# Patient Record
Sex: MALE | Race: BLACK OR AFRICAN AMERICAN | NOT HISPANIC OR LATINO | Employment: OTHER | URBAN - METROPOLITAN AREA
[De-identification: names, ages, dates, MRNs, and addresses within clinical notes are randomized per-mention and may not be internally consistent; named-entity substitution may affect disease eponyms.]

---

## 2022-06-06 ENCOUNTER — TELEPHONE (OUTPATIENT)
Dept: OPHTHALMOLOGY | Facility: CLINIC | Age: 62
End: 2022-06-06
Payer: OTHER GOVERNMENT

## 2022-06-06 ENCOUNTER — PROCEDURE VISIT (OUTPATIENT)
Dept: OPHTHALMOLOGY | Facility: CLINIC | Age: 62
End: 2022-06-06
Payer: OTHER GOVERNMENT

## 2022-06-06 VITALS — SYSTOLIC BLOOD PRESSURE: 152 MMHG | DIASTOLIC BLOOD PRESSURE: 95 MMHG

## 2022-06-06 DIAGNOSIS — S01.112A: Primary | ICD-10-CM

## 2022-06-06 PROCEDURE — 68815 PROBE NASOLACRIMAL DUCT: CPT | Mod: PBBFAC | Performed by: OPHTHALMOLOGY

## 2022-06-06 PROCEDURE — 67935 PR SUTURE EYELID WOUND,FULL THICK: ICD-10-PCS | Mod: S$PBB,LT,, | Performed by: OPHTHALMOLOGY

## 2022-06-06 PROCEDURE — 68815 PR PROBE NASOLAC DUCT,INSERT TUBE/STENT: ICD-10-PCS | Mod: 51,S$PBB,LT, | Performed by: OPHTHALMOLOGY

## 2022-06-06 PROCEDURE — 99499 UNLISTED E&M SERVICE: CPT | Mod: S$PBB,,, | Performed by: OPHTHALMOLOGY

## 2022-06-06 PROCEDURE — 67935 REPAIR EYELID WOUND: CPT | Mod: PBBFAC | Performed by: OPHTHALMOLOGY

## 2022-06-06 PROCEDURE — 67935 REPAIR EYELID WOUND: CPT | Mod: S$PBB,LT,, | Performed by: OPHTHALMOLOGY

## 2022-06-06 PROCEDURE — 15004 PR WND PREP PED, FACE/NCK/HND/FT/GEN 1ST 100 CM: ICD-10-PCS | Mod: 51,S$PBB,, | Performed by: OPHTHALMOLOGY

## 2022-06-06 PROCEDURE — 15004 WOUND PREP F/N/HF/G: CPT | Mod: PBBFAC | Performed by: OPHTHALMOLOGY

## 2022-06-06 PROCEDURE — 15004 WOUND PREP F/N/HF/G: CPT | Mod: 51,S$PBB,, | Performed by: OPHTHALMOLOGY

## 2022-06-06 PROCEDURE — 68815 PROBE NASOLACRIMAL DUCT: CPT | Mod: 51,S$PBB,LT, | Performed by: OPHTHALMOLOGY

## 2022-06-06 PROCEDURE — 99499 NO LOS: ICD-10-PCS | Mod: S$PBB,,, | Performed by: OPHTHALMOLOGY

## 2022-06-06 RX ORDER — HYDROCODONE BITARTRATE AND ACETAMINOPHEN 5; 325 MG/1; MG/1
1 TABLET ORAL EVERY 6 HOURS PRN
Qty: 16 TABLET | Refills: 0 | Status: SHIPPED | OUTPATIENT
Start: 2022-06-06 | End: 2024-03-27

## 2022-06-06 NOTE — PROGRESS NOTES
HPI     Pt states that on Saturday he tried to run off a small dog who was trying   to assault him and as he was running he fell into a ditch head first.   Doing this, he suffered a lid laceration of the LLL near the medial   canthus.   He is having a lot of pain on his back and neck that is distracting him   from any pain near the eye.  He states there seems to be a lot of dryness OS since the incident.   He has taken naproxen.     Last edited by Shruthi Sahu on 6/6/2022 11:20 AM. (History)            Assessment /Plan     For exam results, see Encounter Report.    Canalicular eyelid laceration, left, initial encounter    Other orders  -     HYDROcodone-acetaminophen (NORCO) 5-325 mg per tablet; Take 1 tablet by mouth every 6 (six) hours as needed for Pain.  Dispense: 16 tablet; Refill: 0        Procedure: Canalicular Eyelid Laceration Repair, Left Lower Eyelid  Date of Procedure:  06/06/2022  Staff Physician: Adore Pereira MD  Resident Physician:  Edmar Bolaños MD  Pre-Operative Diagnosis:   Full-thickness Canalicular-involving left lower eyelid laceration  Post-Operative Diagnosis: Same as pre-operative diagnosis  Treatments/Procedures Performed:   1. Left lower eyelid laceration repair, canalicular-involving   2. Removal of granulation tissue   3. Probing, nasolacrimal duct, with tube insertion, left  Intraoperative Findings: Left lower eyelid laceration, canalicular involving (5 mm long)  Anesthesia: Topical 0.5% proparacaine eye drops, Local (mixture of 2% lidocaine with 1:200,000 epinephrine, 0.5% bupivacaine, Vitrase)   Complications: None  Estimated Blood Loss:  < 5 cc  Implant: Mini Monoka stent  Specimens: None   Procedure in detail: 62 year old male presenting with left eyelid laceration secondary to trauma presenting for repair.  Risks, benefits, alternatives, and complications were discussed thoroughly with the patient and consent was obtained, the same discussion was repeated in the pre-operative  area with the patient.     The patient was brought to the operating room and placed in supine position.  A time out was performed including patient's name, date of birth, allergies, surgical site, and anticipated procedure.  After adequate anesthesia was achieved, the patient was prepped and draped in sterile fashion using topical Betadine. A BSS-soaked gauze was placed over the closed right eye for the duration of the case.      Attention was drawn to the left eye. The left lower eyelid laceration appreciated at the medial canthus was inspected thoroughly and measured 5 mm in length. A corneal shield was placed in the left eye. A 15-blade was used to debride the granulation tissue along the borders of the eyelid defect. A punctal dilator was introduced into the left lower canaliculus and dilated then the dilator was removed. A #1 Marc probe was introduced into the left lower canaliculus and found to exit through the laceration, the Marc probe was then removed. A mini Monoka stent was placed through the left lower punctum and out through the wound. Upon inspection, the distal end of the canaliculus was identified. The mini Monoka stent was trimmed to appropriate length and used to intubate the distal end of the lower canaliculus. Two Vicryl 7-0 horizontal mattress sutures were then used to reapproximate the lacerated ends of the orbicularis muscle overlying the mini Monoka stent allowing for complete coverage of the stent, using 1 suture anterior to the stent and 1 posterior. The placement of these sutures reapproximated the posterior and anterior aspects of the lacerated eyelid margins in the medial canthal region. A 6-0 Nurolon suture was placed in vertical mattress fashion at the lash line to approximate and isaac the wound edges at the eyelid margin; the tails were left long and tied down with a superficial 6-0 Nurolon suture on the skin of the inferior aspect of the left lower eyelid. The skin was then  closed with interrupted 6-0 Nurolon. Good approximation was attained. Tobradex ointment was placed over the closed laceration wounds     The patient tolerated the procedure without complication. The patient was educated on post-procedure care and general return precautions. All questions were answered. The patient will follow-up in clinic as directed.

## 2022-06-14 ENCOUNTER — OFFICE VISIT (OUTPATIENT)
Dept: OPHTHALMOLOGY | Facility: CLINIC | Age: 62
End: 2022-06-14
Payer: OTHER GOVERNMENT

## 2022-06-14 DIAGNOSIS — Z98.890 POST-OPERATIVE STATE: Primary | ICD-10-CM

## 2022-06-14 DIAGNOSIS — S01.112D CANALICULAR LACERATION, LEFT, SUBSEQUENT ENCOUNTER: ICD-10-CM

## 2022-06-14 PROCEDURE — 92285 EXTERNAL OCULAR PHOTOGRAPHY: CPT | Mod: PBBFAC | Performed by: OPHTHALMOLOGY

## 2022-06-14 PROCEDURE — 99212 OFFICE O/P EST SF 10 MIN: CPT | Mod: PBBFAC | Performed by: OPHTHALMOLOGY

## 2022-06-14 PROCEDURE — 99999 PR PBB SHADOW E&M-EST. PATIENT-LVL II: ICD-10-PCS | Mod: PBBFAC,,, | Performed by: OPHTHALMOLOGY

## 2022-06-14 PROCEDURE — 99999 PR PBB SHADOW E&M-EST. PATIENT-LVL II: CPT | Mod: PBBFAC,,, | Performed by: OPHTHALMOLOGY

## 2022-06-14 PROCEDURE — 99024 POSTOP FOLLOW-UP VISIT: CPT | Mod: ,,, | Performed by: OPHTHALMOLOGY

## 2022-06-14 PROCEDURE — 99024 PR POST-OP FOLLOW-UP VISIT: ICD-10-PCS | Mod: ,,, | Performed by: OPHTHALMOLOGY

## 2022-06-14 NOTE — PROGRESS NOTES
Assessment /Plan     For exam results, see Encounter Report.    Post-operative state  -     External Photography - OU - Both Eyes  -     SUTURE REMOVAL    Canalicular laceration, left, subsequent encounter      Patient doing well! Post-operative instructions reviewed. Sutures removed. All questions answered.  Return in 5 weeks prn sooner any worsening of vision/symptoms or any concerns.

## 2022-09-27 ENCOUNTER — OFFICE VISIT (OUTPATIENT)
Dept: OPHTHALMOLOGY | Facility: CLINIC | Age: 62
End: 2022-09-27
Payer: OTHER GOVERNMENT

## 2022-09-27 DIAGNOSIS — S01.119D: Primary | ICD-10-CM

## 2022-09-27 PROCEDURE — 99214 PR OFFICE/OUTPT VISIT, EST, LEVL IV, 30-39 MIN: ICD-10-PCS | Mod: 25,S$PBB,, | Performed by: OPHTHALMOLOGY

## 2022-09-27 PROCEDURE — 99211 OFF/OP EST MAY X REQ PHY/QHP: CPT | Mod: PBBFAC,25 | Performed by: OPHTHALMOLOGY

## 2022-09-27 PROCEDURE — 68840 EXPLORE/IRRIGATE TEAR DUCTS: CPT | Mod: S$PBB,LT,, | Performed by: OPHTHALMOLOGY

## 2022-09-27 PROCEDURE — 68840 EXPLORE/IRRIGATE TEAR DUCTS: CPT | Mod: PBBFAC | Performed by: OPHTHALMOLOGY

## 2022-09-27 PROCEDURE — 99214 OFFICE O/P EST MOD 30 MIN: CPT | Mod: 25,S$PBB,, | Performed by: OPHTHALMOLOGY

## 2022-09-27 PROCEDURE — 99999 PR PBB SHADOW E&M-EST. PATIENT-LVL I: CPT | Mod: PBBFAC,,, | Performed by: OPHTHALMOLOGY

## 2022-09-27 PROCEDURE — 68840 PR EXPLORE LACRIMAL CANALICULI: ICD-10-PCS | Mod: S$PBB,LT,, | Performed by: OPHTHALMOLOGY

## 2022-09-27 PROCEDURE — 99999 PR PBB SHADOW E&M-EST. PATIENT-LVL I: ICD-10-PCS | Mod: PBBFAC,,, | Performed by: OPHTHALMOLOGY

## 2022-09-27 RX ORDER — BRIMONIDINE TARTRATE 0.025 %
1 DROPS OPHTHALMIC (EYE) EVERY 8 HOURS PRN
Qty: 2.5 ML | Refills: 3 | Status: SHIPPED | OUTPATIENT
Start: 2022-09-27

## 2022-09-27 NOTE — PROGRESS NOTES
HPI    Edmar Benitez is a/an 62 y.o. male here for left stent removal  Date of procedure: 6/6/22  Procedure: Lid lac repair and stent placement LLL  Oral antibiotics: none   Eye meds: none    Pt doing well following sx   Slight itching near where stent was placed    Last edited by Shruthi Sahu on 9/27/2022  3:25 PM.            Assessment /Plan     For exam results, see Encounter Report.    Canalicular laceration, subsequent encounter    Other orders  -     brimonidine (LUMIFY) 0.025 % Drop; Apply 1 drop to eye every 8 (eight) hours as needed.  Dispense: 2.5 mL; Refill: 3             Patient doing well! Mini Monoka removed from LL puncta without complications.    Procedure note:  Tear duct probe RBA discussed and consent signed. System found to be patent.     Eye redness. Start Lumify TID PRN

## 2024-03-22 DIAGNOSIS — M25.562 LEFT KNEE PAIN, UNSPECIFIED CHRONICITY: Primary | ICD-10-CM

## 2024-03-26 ENCOUNTER — TELEPHONE (OUTPATIENT)
Dept: ORTHOPEDICS | Facility: CLINIC | Age: 64
End: 2024-03-26
Payer: OTHER GOVERNMENT

## 2024-03-26 NOTE — TELEPHONE ENCOUNTER
Spoke with patient.  Appointment tomorrow changed to 1 p.m. due to  surgery. Verbalized understanding.

## 2024-03-27 ENCOUNTER — OFFICE VISIT (OUTPATIENT)
Dept: ORTHOPEDICS | Facility: CLINIC | Age: 64
End: 2024-03-27
Payer: OTHER GOVERNMENT

## 2024-03-27 ENCOUNTER — HOSPITAL ENCOUNTER (OUTPATIENT)
Dept: RADIOLOGY | Facility: HOSPITAL | Age: 64
Discharge: HOME | End: 2024-03-27
Attending: ORTHOPAEDIC SURGERY
Payer: OTHER GOVERNMENT

## 2024-03-27 VITALS — HEIGHT: 76 IN | BODY MASS INDEX: 28.49 KG/M2 | WEIGHT: 234 LBS

## 2024-03-27 DIAGNOSIS — M17.12 PRIMARY OSTEOARTHRITIS OF LEFT KNEE: Primary | ICD-10-CM

## 2024-03-27 DIAGNOSIS — M25.562 LEFT KNEE PAIN, UNSPECIFIED CHRONICITY: ICD-10-CM

## 2024-03-27 PROCEDURE — 99213 OFFICE O/P EST LOW 20 MIN: CPT | Mod: PBBFAC,25,PN | Performed by: ORTHOPAEDIC SURGERY

## 2024-03-27 PROCEDURE — 99999 PR PBB SHADOW E&M-EST. PATIENT-LVL III: CPT | Mod: PBBFAC,,, | Performed by: ORTHOPAEDIC SURGERY

## 2024-03-27 PROCEDURE — 99203 OFFICE O/P NEW LOW 30 MIN: CPT | Mod: S$PBB,,, | Performed by: ORTHOPAEDIC SURGERY

## 2024-03-27 PROCEDURE — 73564 X-RAY EXAM KNEE 4 OR MORE: CPT | Mod: TC,PN,LT

## 2024-03-27 PROCEDURE — 73564 X-RAY EXAM KNEE 4 OR MORE: CPT | Mod: 26,LT,, | Performed by: RADIOLOGY

## 2024-03-27 NOTE — PROGRESS NOTES
[facility], Orthopedics and Sports Medicine  [facility]    New Patient Office Visit  03/27/2024     Subjective:      Edmar Benitez is a 63 y.o. male referred by Roseannrefstefanyal Bola for evaluation and treatment of left knee pain. This is evaluated as a personal injury.  The patient has the following symptoms: pain located medial left knee pain .  The symptoms began around 10 years ago and are worsening.  [facility].  He endorses receiving steroid injections in the past and 1 gel injection. Received around 4 steroid injections and one gel injection over the past few years. His last injection was the gel injection which was around 10 months ago. He does say after the steroid injections he would receive about 3 months of pain relief following. He did receive 1 gel injection about 10 months ago which provided no pain relief. Current daily medications include meloxicam as needed. Patient endorses there was discussion for knee replacement but never did follow-up.     Patient is currently on disability. Previously worked as an . He would like to return to work.    Outside reports reviewed: historical medical records, office notes, radiology reports, and x-ray reports.    History reviewed. No pertinent past medical history.    There is no problem list on file for this patient.      History reviewed. No pertinent surgical history.     Current Outpatient Medications   Medication Instructions    brimonidine (LUMIFY) 0.025 % Drop 1 drop, Ophthalmic, Every 8 hours PRN        Review of patient's allergies indicates:  No Known Allergies    Social History     Socioeconomic History    Marital status:    Tobacco Use    Smoking status: Unknown       History reviewed. No pertinent family history.      Review of Systems   Constitutional: Negative for chills and fever.   HENT:  Negative for congestion.    Eyes:  Negative for  blurred vision.   Cardiovascular:  Negative for chest pain, dyspnea on exertion and palpitations.   Respiratory:  Negative for cough and shortness of breath.    Hematologic/Lymphatic: Negative for bleeding problem. Does not bruise/bleed easily.   Skin:  Negative for color change and rash.   Musculoskeletal:  Positive for joint pain (L knee). Negative for joint swelling and muscle weakness.   Gastrointestinal:  Negative for abdominal pain.   Genitourinary:  Negative for bladder incontinence.   Neurological:  Negative for focal weakness, numbness and paresthesias.   Psychiatric/Behavioral:  Negative for depression. The patient is not nervous/anxious.           Objective:      General    Constitutional: He is oriented to person, place, and time. He appears well-developed and well-nourished.   HENT:   Head: Normocephalic and atraumatic.   Eyes: Pupils are equal, round, and reactive to light.   Cardiovascular:  Normal rate and regular rhythm.            Pulmonary/Chest: Effort normal and breath sounds normal. No respiratory distress.   Abdominal: Soft.   Neurological: He is alert and oriented to person, place, and time.   Psychiatric: He has a normal mood and affect. His behavior is normal.           Right Knee Exam     Inspection   Swelling: absent  Effusion: absent  Deformity: absent    Tenderness   The patient is experiencing no tenderness.     Range of Motion   Extension:  0   Flexion:  120     Tests   Ligament Examination   Lachman: normal (-1 to 2mm)   PCL-Posterior Drawer: normal (0 to 2mm)     MCL - Valgus: normal (0 to 2mm)  LCL - Varus: normal    Other   Sensation: normal    Left Knee Exam     Inspection   Swelling: absent  Effusion: absent  Deformity: absent    Tenderness   The patient tender to palpation of the medial joint line and lateral joint line.    Crepitus   The patient has crepitus of the patella.    Range of Motion   Extension:  5   Flexion:  110     Tests   Stability   Lachman: normal (-1 to 2mm)    PCL-Posterior Drawer: normal (0 to 2mm)  MCL - Valgus: normal (0 to 2mm)  LCL - Varus: normal (0 to 2mm)    Other   Sensation: normal    Muscle Strength   Right Lower Extremity   Quadriceps:  5/5   Hamstrin/5   Left Lower Extremity   Quadriceps:  5/5   Hamstrin/5       Imaging:  Radiographs of the left knee taken taken 2024 were personally reviewed from the [facility] EMR.  Multiple views of the knee are available today for review, including a standing AP, a standing notch view, lateral view, and a merchant view.  The tibiofemoral compartment demonstrates severe degenerative changes.  The patellofemoral compartment demonstrates severe degenerative changes.  No acute fractures or dislocations are noted in these images.      Procedures        Assessment:       Edmar Benitez is a 63 y.o. male seen in the office today. The encounter diagnosis was Primary osteoarthritis of left knee.  Referal to another provider (arthroplasty ) is recommended at this time. Can use Tylenol and sparing use of NSAIDs. The natural history and expected course discussed with patient. Various treatment options were discussed, including their risks and benefits. All of the patient's questions were answered. Frustrated with conservative treatment failure, wants to discuss more permanent solution.      Plan:      Tylenol 650mg TID, PRN pain.  Follow up as needed if symptoms worsen.  Referral to arthroplasty .        Marko Yatse IV, MD   of Clinical Orthopedics  Department of Orthopedic Surgery  [facility]  Office: [phone]  Website: [email or web address]      Orders Placed This Encounter    Ambulatory referral/consult to Orthopedics

## 2024-04-02 ENCOUNTER — RESEARCH ENCOUNTER (OUTPATIENT)
Dept: RESEARCH | Facility: HOSPITAL | Age: 64
End: 2024-04-02
Payer: OTHER GOVERNMENT

## 2024-04-02 ENCOUNTER — OFFICE VISIT (OUTPATIENT)
Dept: ORTHOPEDICS | Facility: CLINIC | Age: 64
End: 2024-04-02
Payer: OTHER GOVERNMENT

## 2024-04-02 VITALS — WEIGHT: 233.94 LBS | BODY MASS INDEX: 28.49 KG/M2 | HEIGHT: 76 IN

## 2024-04-02 DIAGNOSIS — M17.12 PRIMARY OSTEOARTHRITIS OF LEFT KNEE: Primary | ICD-10-CM

## 2024-04-02 DIAGNOSIS — G89.29 CHRONIC PAIN OF LEFT KNEE: ICD-10-CM

## 2024-04-02 DIAGNOSIS — M25.562 CHRONIC PAIN OF LEFT KNEE: ICD-10-CM

## 2024-04-02 PROCEDURE — 99213 OFFICE O/P EST LOW 20 MIN: CPT | Mod: 25,S$PBB,, | Performed by: ORTHOPAEDIC SURGERY

## 2024-04-02 PROCEDURE — 99214 OFFICE O/P EST MOD 30 MIN: CPT | Mod: PBBFAC,PN,25 | Performed by: ORTHOPAEDIC SURGERY

## 2024-04-02 PROCEDURE — 99999 PR PBB SHADOW E&M-EST. PATIENT-LVL IV: CPT | Mod: PBBFAC,,, | Performed by: ORTHOPAEDIC SURGERY

## 2024-04-02 PROCEDURE — 20610 DRAIN/INJ JOINT/BURSA W/O US: CPT | Mod: PBBFAC,PN,LT | Performed by: ORTHOPAEDIC SURGERY

## 2024-04-02 PROCEDURE — 99999PBSHW PR PBB SHADOW TECHNICAL ONLY FILED TO HB: Mod: PBBFAC,,,

## 2024-04-02 RX ORDER — BUPROPION HYDROCHLORIDE 150 MG/1
1 TABLET ORAL DAILY
COMMUNITY
Start: 2024-02-14

## 2024-04-02 RX ORDER — AMLODIPINE BESYLATE 10 MG/1
10 TABLET ORAL
COMMUNITY
Start: 2023-06-12

## 2024-04-02 RX ORDER — BUTALB/ACETAMINOPHEN/CAFFEINE 50-325-40
1 TABLET ORAL 2 TIMES DAILY
COMMUNITY
Start: 2023-06-12

## 2024-04-02 RX ORDER — TAMSULOSIN HYDROCHLORIDE 0.4 MG/1
0.4 CAPSULE ORAL
COMMUNITY
Start: 2024-01-14

## 2024-04-02 RX ORDER — EZETIMIBE 10 MG/1
10 TABLET ORAL
COMMUNITY
Start: 2023-06-12

## 2024-04-02 RX ORDER — LIDOCAINE HYDROCHLORIDE 10 MG/ML
4 INJECTION INFILTRATION; PERINEURAL
Status: DISCONTINUED | OUTPATIENT
Start: 2024-04-02 | End: 2024-04-02 | Stop reason: HOSPADM

## 2024-04-02 RX ORDER — BUPIVACAINE HYDROCHLORIDE 5 MG/ML
5 INJECTION, SOLUTION PERINEURAL
Status: DISCONTINUED | OUTPATIENT
Start: 2024-04-02 | End: 2024-04-02 | Stop reason: HOSPADM

## 2024-04-02 RX ORDER — FLUTICASONE PROPIONATE 50 MCG
SPRAY, SUSPENSION (ML) NASAL
COMMUNITY
Start: 2023-06-12

## 2024-04-02 RX ORDER — KETOROLAC TROMETHAMINE 30 MG/ML
30 INJECTION, SOLUTION INTRAMUSCULAR; INTRAVENOUS
Status: DISCONTINUED | OUTPATIENT
Start: 2024-04-02 | End: 2024-04-02 | Stop reason: HOSPADM

## 2024-04-02 RX ORDER — MELOXICAM 7.5 MG/1
7.5 TABLET ORAL
COMMUNITY
Start: 2023-08-04

## 2024-04-02 RX ORDER — LORATADINE 10 MG/1
10 TABLET ORAL
COMMUNITY
Start: 2023-06-12

## 2024-04-02 RX ORDER — ERGOCALCIFEROL 1.25 MG/1
CAPSULE ORAL
COMMUNITY
Start: 2023-06-12

## 2024-04-02 RX ADMIN — KETOROLAC TROMETHAMINE 30 MG: 30 INJECTION, SOLUTION INTRAMUSCULAR; INTRAVENOUS at 09:04

## 2024-04-02 RX ADMIN — LIDOCAINE HYDROCHLORIDE 4 ML: 10 INJECTION, SOLUTION INFILTRATION; PERINEURAL at 09:04

## 2024-04-02 RX ADMIN — BUPIVACAINE HYDROCHLORIDE 5 ML: 5 INJECTION, SOLUTION PERINEURAL at 09:04

## 2024-04-02 NOTE — PROGRESS NOTES
[facility] [address]          Subjective:     Patient ID: Edmar Benitez is a 63 y.o. male with chief complaint of left knee pain.  He was tried numerous rounds of corticosteroid and gel injections.  He got minimal relief from these.  He was tried anti-inflammatories including meloxicam which he was on currently.  He has had a few sessions with physical therapy.      KNEE PAIN: Complains of pain to the leftknee.   PAIN LOCATED: anterior, lateral, and medial  ONSET: 20 years ago.  QUALITY:  Patient states the pain is worsening  HISTORY: Previous knee injury/surgery: No   ASSOCIATED SYMPTOM AND TRIGGERS:Standing/Weightbearing, walking, trouble w stairs, stiffness, swelling, limping, stiffness w sitting   Has tried and failed cardiovascular activities such as walking, biking and resistance exercises  USES ASSISTIVE DEVICE: none  RELIEVED BY:rest  PATIENT DENIES: bruising, redness, deformity      Chief Complaint: Pain of the Left Knee        History reviewed. No pertinent past medical history.     History reviewed. No pertinent surgical history.     Current Outpatient Medications   Medication Instructions    amLODIPine (NORVASC) 10 mg    brimonidine (LUMIFY) 0.025 % Drop 1 drop, Ophthalmic, Every 8 hours PRN    buPROPion (WELLBUTRIN XL) 150 MG TB24 tablet 1 tablet, Oral, Daily    calcium citrate-vitamin D3 315-200 mg (CITRACAL+D) 315 mg-5 mcg (200 unit) per tablet 1 tablet, Oral, 2 times daily    empagliflozin (JARDIANCE) 25 mg tablet 1 tablet, Oral, Every morning    ergocalciferol (ERGOCALCIFEROL) 50,000 unit Cap TAKE 50,000 UNITS (1,250MCG) BY MOUTH EVERY WEEK AS A VITAMIN SUPPLEMENT    ezetimibe (ZETIA) 10 mg    fluticasone propionate (FLONASE) 50 mcg/actuation nasal spray INSTILL 1 SPRAY IN EACH NOSTRIL EVERY DAY AS NEEDED FOR ALLERGIES    loratadine (CLARITIN) 10 mg    meloxicam (MOBIC) 7.5 mg    tamsulosin (FLOMAX) 0.4 mg        Review of patient's allergies indicates:  No Known Allergies    Social  History     Socioeconomic History    Marital status:    Tobacco Use    Smoking status: Unknown   Substance and Sexual Activity    Alcohol use: Never    Drug use: Never       History reviewed. No pertinent family history.      Review of systems positive for left knee pain.     Objective:   Physical Exam:     Skin atraumatic   Tender to palpation worse medially than laterally  ROM   Stable anterior/posterior   Stable varus/valgus  No groin pain with rotation of hip  Motor intact TA/GS/EHL/FHL  SILT SP/DP/Sa/Starkey/T  Toes WWP      Imaging:   X-Ray left knee reviewed, KL 4 changes with joint space narrowing, sclerosis, and osteophytosis.       Assessment:        Edmar Benitez is a 63 y.o. male with severe left knee osteoarthritis    Encounter Diagnoses   Name Primary?    Primary osteoarthritis of left knee Yes    Chronic pain of left knee        Plan :  we injected the left knee w 1cc of ketorolac, marcaine and lidocaine under sterile conditions with the patient's informed consent for severe bone on bone knee oa   Follow up for Iovera  Tentatively plan for left total knee arthroplasty 11/18/2024    I discussed a new treatment therapy called Iovera, which is cryotherapy, to provide symptomatic relief along the sensory distribution of the infrapatellar tendon branch of the saphenous nerve, AFCN, and LFCN.  The patient elected to move forward with this.  We did discuss the fact that this is a fairly novel procedure and the is very limited scientific data around this; however, it is FDA approved.  In a few patients there can be continued pain along the treated nerve but the exact risk has not been quantified but seems to be rare. The patient was given patient information and literature to review prior to the procedure as well. Based on this, the patient feels the benefits outweigh the risks.               Orders Placed This Encounter   Procedures    Large Joint Aspiration/Injection: L knee    Iovera         Toñito Escobar MD   04/02/2024

## 2024-04-02 NOTE — PROCEDURES
Large Joint Aspiration/Injection: L knee    Date/Time: 4/2/2024 9:45 AM    Performed by: Obed Wiley MD  Authorized by: Obed Wiley MD    Consent Done?:  Yes (Verbal)  Indications:  Arthritis  Site marked: the procedure site was marked    Timeout: prior to procedure the correct patient, procedure, and site was verified    Prep: patient was prepped and draped in usual sterile fashion      Local anesthesia used?: Yes    Local anesthetic:  Topical anesthetic    Details:  Needle Size:  22 G  Ultrasonic Guidance for needle placement?: No    Approach:  Anterolateral  Location:  Knee  Site:  L knee  Medications:  30 mg ketorolac 30 mg/mL (1 mL); 5 mL BUPivacaine 0.5 % (5 mg/mL); 4 mL LIDOcaine HCL 10 mg/ml (1%) 10 mg/mL (1 %)  Patient tolerance:  Patient tolerated the procedure well with no immediate complications

## 2024-04-02 NOTE — PROGRESS NOTES
Protocol: innovations in Genicular Outcomes Registry (Deidre)  Protocol#: Deidre  IRB#: 2021.156  Version Date: 10-Adiel-2023  PI: Obed Wiley MD  Patient Initials: HEVER     Study Recruitment Note:     Research coordinator met with patient, in private clinic room, to discuss above mentioned protocol. Patient is alert and oriented x 3. Mood and affect appropriate to the situation. Patient states that he is not participating in any other research studies. Patient states understanding about the diagnosis of osteoarthritis of the knee and of the procedure to being done on that knee.  Purpose of study reviewed with the patient.  Patient states understanding of this information.   Length of study and number of participants reviewed with patient; patient states understanding of the length of the study.   Study procedure discussed in detail with patient. Patient states understanding of this information.  Potential benefits of study along with costs and/or payment reimbursement per insurance discussed with patient; Patient states understanding that insurance will cover cost of all standard of care medications and procedures. Patient aware of $20 payment for qualifying visits with completed questionnaires.  Alternative treatment methods discussed with patient; Patient states understanding of this information.   Study related questions/compensation for injury, and whom to contact regarding rights as a research subject all reviewed with patient; Patient verbalizes understanding of this information.   Voluntary participation and withdrawal from research stressed to patient; patient states understanding that he may withdraw consent at any time without compromise to care.   Confidentiality and HIPAA discussed with patient. Patient verbalizes understanding of this information.          Patient states his interest in study and would like to think about participation. Study brochure and paper copy of consent form was given to patient for  review. He was instructed to call if he has any questions or concerns about the study. Patient verbally stated his understanding.

## 2024-04-24 ENCOUNTER — TELEPHONE (OUTPATIENT)
Dept: ORTHOPEDICS | Facility: CLINIC | Age: 64
End: 2024-04-24
Payer: OTHER GOVERNMENT

## 2024-04-24 NOTE — TELEPHONE ENCOUNTER
Spoke with patient.  Iovera appointment canceled tomorrow. Insurance has not approved. [facility]. Verbalized understanding and will call once done to reschedule.

## 2024-07-09 ENCOUNTER — CLINICAL SUPPORT (OUTPATIENT)
Dept: LAB | Facility: HOSPITAL | Age: 64
End: 2024-07-09
Attending: ORTHOPAEDIC SURGERY
Payer: OTHER GOVERNMENT

## 2024-07-09 ENCOUNTER — RESEARCH ENCOUNTER (OUTPATIENT)
Dept: RESEARCH | Facility: HOSPITAL | Age: 64
End: 2024-07-09
Payer: OTHER GOVERNMENT

## 2024-07-09 ENCOUNTER — HOSPITAL ENCOUNTER (OUTPATIENT)
Dept: RADIOLOGY | Facility: HOSPITAL | Age: 64
Discharge: HOME | End: 2024-07-09
Attending: ORTHOPAEDIC SURGERY
Payer: OTHER GOVERNMENT

## 2024-07-09 ENCOUNTER — OFFICE VISIT (OUTPATIENT)
Dept: ORTHOPEDICS | Facility: CLINIC | Age: 64
End: 2024-07-09
Payer: OTHER GOVERNMENT

## 2024-07-09 VITALS — WEIGHT: 233.94 LBS | HEIGHT: 76 IN | BODY MASS INDEX: 28.49 KG/M2

## 2024-07-09 DIAGNOSIS — M25.562 CHRONIC PAIN OF LEFT KNEE: ICD-10-CM

## 2024-07-09 DIAGNOSIS — F32.A DEPRESSION, UNSPECIFIED DEPRESSION TYPE: ICD-10-CM

## 2024-07-09 DIAGNOSIS — G89.29 CHRONIC PAIN OF LEFT KNEE: ICD-10-CM

## 2024-07-09 DIAGNOSIS — M17.12 PRIMARY OSTEOARTHRITIS OF LEFT KNEE: ICD-10-CM

## 2024-07-09 DIAGNOSIS — M62.81 QUADRICEPS WEAKNESS: ICD-10-CM

## 2024-07-09 DIAGNOSIS — M17.12 PRIMARY OSTEOARTHRITIS OF LEFT KNEE: Primary | ICD-10-CM

## 2024-07-09 PROCEDURE — 77073 BONE LENGTH STUDIES: CPT | Mod: TC,FY

## 2024-07-09 PROCEDURE — 99999 PR PBB SHADOW E&M-EST. PATIENT-LVL IV: CPT | Mod: PBBFAC,,, | Performed by: ORTHOPAEDIC SURGERY

## 2024-07-09 PROCEDURE — 77073 BONE LENGTH STUDIES: CPT | Mod: 26,,, | Performed by: RADIOLOGY

## 2024-07-09 PROCEDURE — G2211 COMPLEX E/M VISIT ADD ON: HCPCS | Mod: S$PBB,,, | Performed by: ORTHOPAEDIC SURGERY

## 2024-07-09 PROCEDURE — 71045 X-RAY EXAM CHEST 1 VIEW: CPT | Mod: TC,FY

## 2024-07-09 PROCEDURE — 99214 OFFICE O/P EST MOD 30 MIN: CPT | Mod: PBBFAC,PN | Performed by: ORTHOPAEDIC SURGERY

## 2024-07-09 PROCEDURE — 93005 ELECTROCARDIOGRAM TRACING: CPT

## 2024-07-09 PROCEDURE — 99215 OFFICE O/P EST HI 40 MIN: CPT | Mod: S$PBB,,, | Performed by: ORTHOPAEDIC SURGERY

## 2024-07-09 PROCEDURE — 71045 X-RAY EXAM CHEST 1 VIEW: CPT | Mod: 26,,, | Performed by: RADIOLOGY

## 2024-07-09 PROCEDURE — 93010 ELECTROCARDIOGRAM REPORT: CPT | Mod: ,,, | Performed by: INTERNAL MEDICINE

## 2024-07-09 NOTE — PROGRESS NOTES
Subjective:      Patient ID: Edmar Benitez is a 64 y.o. male.    Chief Complaint: Pain of the Left Knee    Knee Pain: left  swelling: Yes  worsens with activity: Yes  relieved by: nsaid's         Social History     Occupational History    Not on file   Tobacco Use    Smoking status: Unknown    Smokeless tobacco: Not on file   Substance and Sexual Activity    Alcohol use: Never    Drug use: Never    Sexual activity: Not on file      Social Determinants of Health     Tobacco Use: Not on file   Alcohol Use: Not on file   Financial Resource Strain: Not on file   Food Insecurity: Not on file   Transportation Needs: Not on file   Physical Activity: Not on file   Stress: Not on file   Housing Stability: Not on file   Depression: Low Risk  (3/27/2024)    Depression     Last PHQ-4: Flowsheet Data: 0   Utilities: Not on file   Health Literacy: Not on file   Social Isolation: Not on file      Review of Systems   Constitutional: Negative for diaphoresis.   HENT:  Negative for ear discharge, nosebleeds and stridor.    Eyes:  Negative for photophobia.   Cardiovascular:  Negative for syncope.   Respiratory:  Negative for hemoptysis, shortness of breath and wheezing.    Neurological:  Negative for tremors.   Psychiatric/Behavioral: Negative.           Objective:    General    Constitutional: He is oriented to person, place, and time. He appears well-developed and well-nourished.   HENT:   Head: Normocephalic and atraumatic.   Right Ear: External ear normal.   Left Ear: External ear normal.   Eyes: EOM are normal.   Pulmonary/Chest: Effort normal.   Neurological: He is alert and oriented to person, place, and time.   Psychiatric: He has a normal mood and affect. His behavior is normal. Judgment and thought content normal.     General Musculoskeletal Exam   Gait: antalgic and abnormal         Left Knee Exam     Inspection   Swelling: present  Effusion: present    Tenderness   The patient tender to palpation of the medial joint  line.    Crepitus   The patient has crepitus of the patella.    Other   Sensation: normal    Muscle Strength   Left Lower Extremity   Quadriceps:  4/5   Hamstrin/5          Assessment:       1. Primary osteoarthritis of left knee    2. Chronic pain of left knee    3. Quadriceps weakness          Plan:   The patient has failed non operative management, has painful crepitus, and has swelling. The natural history of severe degenerative arthritis was discussed at length and nonoperative and operative treatment was reviewed. Due to the pain and associated disability, I recommend left total knee replacement for KL 4.  The risks, benefits, convalescence, and alternatives were reviewed.  Numerous questions were asked and answered.  Models were used as an education tool.  Surgery will be scheduled at a convenient time.  The discussion with the patient included a description of a total knee replacement.  A total knee replacement (TKR) means a resurfacing of all three surfaces-the patella, femur, and tibia, with metal and plastic parts. The prosthetic parts are usually cemented into position and will allow a patient a full range of motion from. The postoperative motion, however, is determined by multiple factors, the most important of which is preoperative motion. In general, the better the motion preoperatively, the better the motion postoperatively.  In patients with good bone stock and bone quality (ie males, younger patients) I will generally use an uncemented tibial component and leave the patella unresurfaced, in an effort to preserve bone stock for any future revision surgeries. In those patients we discuss the risk of anterior knee pain in a small subset of patients (5-10%) with an unresurfaced patella. The operation, pending medical clearance, generally requires a hospitalization of 0-3 days for one knee (possibly longer for a bilateral replacement). In general, we prefer to perform the procedure under  epidural/spinal anesthesia.  Patient blood donation will be based on the individual patient but is not common and based on preoperative hemoglobin/hematocrit levels.The operation will be done preferably in a minimally invasive fashion which will facilitate recovery.  While performing the procedure in a minimally invasive manner is our preference, some patients are not candidates.  In that situation, a non-minimally invasive technique will be performed.  This will not adversely affect the long term success of the TKR.  Postoperatively, the patient is  walking the day of surgery and may be discahrged the day of surgery if stable with a walker or cane.  The first couple of days can be painful and the pain medication will alleviate but not eliminate the pain.  Thus, the patient must really push hard to get the range of motion.   The cane is to be dispensed with once the patient is secure enough.  In general, there is no cast or brace required with a routine knee replacement. In the long term, we do not encourage our patients to run for the sake of running; although, pending their preoperative status, we often allow patients to play doubles tennis or comparable activities.  We also allow them to do gentle intermediate downhill skiing if they are truly an expert skier.  Biking is encouraged as well as swimming.  The follow-up periods are usually at 2 weeks, 3 months, six months, and yearly intervals.  Potential complications with total knee replacements include infection (less than 2%), which is much higher in patients with obesity, diabetes, or other conditions which adversely affect the immune system.  (Patients with a previous history of osteomyelitis can have infection rates as high as 15%).  By nerve damage we mean a peroneal nerve palsy with a foot drop (a flapping foot with ambulation).  This is particularly more apt to occur with a bad valgus (knock knee) deformity.  The incidence can be quite high in this  particular patient population.  There are always areas of skin numbness, but this is not an untoward effect, nor do we consider it a complication.  Other potential complications include dislocation of the patellar component (less than 2%).  The loosening of either the tibial or femoral component is much more infrequent.  It usually occurs with infection or long-term use in a patient population that is at extreme risk (e.g., markedly overweight and not conscientious about their exercises).  Major blood vessel damage is also extremely rare.  Theoretically, because of the anatomic proximity of the popliteal artery, it could be lacerated with subsequent repairs required.  Albeit unlikely, a disruption of the popliteal artery could theoretically result in an amputation.  Similarly, infection could theoretically result in an amputation if one were to grow out an organism that cannot be controlled with antibiotics.  General medical complications include phlebitis for which we prophylactically anticoagulate, but it could still occur and fatal pulmonary embolus has been reported.  Cardiovascular problems, such as a heart attack or ischemia, are always a concern with such hemodynamic changes in the blood vascular system.  Our goal is to improve at least 80% of the pain and hopefully 100% but some aspects of the patients anatomy or other factors may not provide complete pain relief. Other general complications are very rare but in medicine anything could theoretically happen. We also discussed performing a pre-operative peripheral sensory block of the bracnhes of the AFCN and ISN of the same knee using iovera to help with pain control post surgery. This is a relatively new technology with early data demonstrating significant pain improvement and functional recovery. However the science defining all the associated risks is still developing. From what we know thus far, a small number of patients can have nerve pain along the  areas of the treated nerves. I think the patient understands the risk benefit ratio of total knee replacement and the iovera treatment and would like to pursue the total knee replacement and iovera treatment. we will begin the pre-operative process.

## 2024-07-09 NOTE — PROGRESS NOTES
Protocol: innovations in [employer] Outcomes Registry ([facility])  Protocol#: [facility]re  IRB#: 2021.156  Version Date: 10-Adiel-2023  PI: Obed Wiley MD  Patient Initials: N.G.     Study Recruitment Note:     Research coordinator met with patient in clinic room to again discuss study and answer any questions he may have had.     Patient had questions regarding treatments which were explained to him by clinic staff to his satisfaction. Iovera that was scheduled for April was denied but patient will try again to get approved for it in time for his LT TKA which is now scheduled for August 2024.     Will contact patient further about study in August.

## 2024-07-10 LAB
OHS QRS DURATION: 96 MS
OHS QTC CALCULATION: 409 MS

## 2024-07-31 ENCOUNTER — HOSPITAL ENCOUNTER (OUTPATIENT)
Dept: PREADMISSION TESTING | Facility: HOSPITAL | Age: 64
Discharge: HOME | End: 2024-07-31
Attending: NURSE PRACTITIONER
Payer: OTHER GOVERNMENT

## 2024-07-31 ENCOUNTER — OFFICE VISIT (OUTPATIENT)
Dept: FAMILY MEDICINE | Facility: HOSPITAL | Age: 64
End: 2024-07-31
Attending: ORTHOPAEDIC SURGERY
Payer: OTHER GOVERNMENT

## 2024-07-31 ENCOUNTER — ANESTHESIA EVENT (OUTPATIENT)
Dept: SURGERY | Facility: HOSPITAL | Age: 64
End: 2024-07-31
Payer: OTHER GOVERNMENT

## 2024-07-31 VITALS
HEIGHT: 76 IN | DIASTOLIC BLOOD PRESSURE: 79 MMHG | SYSTOLIC BLOOD PRESSURE: 125 MMHG | HEART RATE: 77 BPM | WEIGHT: 231.94 LBS | BODY MASS INDEX: 28.25 KG/M2

## 2024-07-31 VITALS
SYSTOLIC BLOOD PRESSURE: 138 MMHG | OXYGEN SATURATION: 97 % | TEMPERATURE: 97 F | WEIGHT: 232.38 LBS | HEIGHT: 76 IN | RESPIRATION RATE: 16 BRPM | DIASTOLIC BLOOD PRESSURE: 82 MMHG | HEART RATE: 78 BPM | BODY MASS INDEX: 28.3 KG/M2

## 2024-07-31 DIAGNOSIS — E78.5 HYPERLIPIDEMIA, UNSPECIFIED HYPERLIPIDEMIA TYPE: Primary | ICD-10-CM

## 2024-07-31 PROCEDURE — 99214 OFFICE O/P EST MOD 30 MIN: CPT

## 2024-07-31 RX ORDER — DICLOFENAC SODIUM 10 MG/G
GEL TOPICAL
COMMUNITY
Start: 2024-06-10

## 2024-07-31 RX ORDER — METFORMIN HYDROCHLORIDE 500 MG/1
1000 TABLET ORAL
COMMUNITY
Start: 2024-03-25

## 2024-07-31 RX ORDER — LIDOCAINE HYDROCHLORIDE 10 MG/ML
1 INJECTION, SOLUTION EPIDURAL; INFILTRATION; INTRACAUDAL; PERINEURAL ONCE
OUTPATIENT
Start: 2024-07-31 | End: 2024-07-31

## 2024-07-31 RX ORDER — SODIUM CHLORIDE, SODIUM LACTATE, POTASSIUM CHLORIDE, CALCIUM CHLORIDE 600; 310; 30; 20 MG/100ML; MG/100ML; MG/100ML; MG/100ML
INJECTION, SOLUTION INTRAVENOUS CONTINUOUS
OUTPATIENT
Start: 2024-07-31

## 2024-07-31 RX ORDER — TRAZODONE HYDROCHLORIDE 100 MG/1
100 TABLET ORAL
COMMUNITY
Start: 2024-02-14

## 2024-07-31 NOTE — ANESTHESIA PREPROCEDURE EVALUATION
"                                                                                                             07/31/2024  Edmar Benitez is a 64 y.o., male scheduled for  ARTHROPLASTY, KNEE, TOTAL monoblock (Left) 8/19/24.    Per family medicine Dr. Zhu, "The patient is medically optimized with a low to moderate risk to proceed with (per ACC/AHA kahlil-operative guidelines) a moderate risk surgery".    History reviewed. No pertinent past medical history.  History reviewed. No pertinent surgical history.  Review of patient's allergies indicates:  No Known Allergies  Current Outpatient Medications   Medication Instructions    amLODIPine (NORVASC) 10 mg    brimonidine (LUMIFY) 0.025 % Drop 1 drop, Ophthalmic, Every 8 hours PRN    buPROPion (WELLBUTRIN XL) 150 MG TB24 tablet 1 tablet, Oral, Daily    calcium citrate-vitamin D3 315-200 mg (CITRACAL+D) 315 mg-5 mcg (200 unit) per tablet 1 tablet, Oral, 2 times daily    empagliflozin (JARDIANCE) 25 mg tablet 1 tablet, Oral, Every morning    ergocalciferol (ERGOCALCIFEROL) 50,000 unit Cap TAKE 50,000 UNITS (1,250MCG) BY MOUTH EVERY WEEK AS A VITAMIN SUPPLEMENT    ezetimibe (ZETIA) 10 mg    fluticasone propionate (FLONASE) 50 mcg/actuation nasal spray INSTILL 1 SPRAY IN EACH NOSTRIL EVERY DAY AS NEEDED FOR ALLERGIES    loratadine (CLARITIN) 10 mg    meloxicam (MOBIC) 7.5 mg    tamsulosin (FLOMAX) 0.4 mg       Pre-op Assessment    I have reviewed the Patient Summary Reports.     I have reviewed the Nursing Notes.    I have reviewed the Medications.     Review of Systems  Anesthesia Hx:  No problems with previous Anesthesia               Denies Personal Hx of Anesthesia complications.                    Social:  Former Smoker, Social Alcohol Use       Hematology/Oncology:    Oncology Normal                                   Cardiovascular:  Cardiovascular Normal Exercise tolerance: good   Denies Pacemaker.        Denies Angina.                                "   Pulmonary:  Pulmonary Normal      Denies Shortness of breath.                  Hepatic/GI:  Hepatic/GI Normal                 Musculoskeletal:  Arthritis               Neurological:  Neurology Normal Denies TIA.  Denies CVA.    Denies Seizures.                                Endocrine:  Endocrine Normal            Psych:    depression                Physical Exam  General: Cooperative, Oriented and Alert    Airway:  Mallampati: II   Mouth Opening: Normal  Neck ROM: Normal ROM    Dental:  Partial Dentures  Upper and lower partial dentures  Chest/Lungs:  Clear to auscultation, Normal Respiratory Rate    Heart:  Rate: Normal      Lab Results   Component Value Date    WBC 4.44 07/09/2024    HGB 16.4 07/09/2024    HCT 48.9 07/09/2024     07/09/2024    ALT 16 07/09/2024    AST 17 07/09/2024     07/09/2024    K 4.0 07/09/2024     07/09/2024    CREATININE 1.6 (H) 07/09/2024    BUN 15 07/09/2024    CO2 28 07/09/2024    INR 1.0 07/09/2024    HGBA1C 6.6 (H) 07/09/2024     Results for orders placed or performed in visit on 07/09/24   EKG 12-lead    Collection Time: 07/09/24  2:57 PM   Result Value Ref Range    QRS Duration 96 ms    [facility] QTC Calculation 409 ms    Narrative    Test Reason : M17.12,M25.562,G89.29,M62.81,F32.A,    Vent. Rate : 055 BPM     Atrial Rate : 055 BPM     P-R Int : 180 ms          QRS Dur : 096 ms      QT Int : 428 ms       P-R-T Axes : 033 053 046 degrees     QTc Int : 409 ms    Sinus bradycardia  Otherwise normal ECG  When compared with ECG of 25-SEP-2009 12:07,  No significant change was found  Confirmed by Wan Myers MD ([id number]) on 7/10/2024 4:54:45 PM    Referred By: LAURA WALKER           Confirmed By:Wan Myers MD         Anesthesia Plan  Type of Anesthesia, risks & benefits discussed:    Anesthesia Type: Spinal  Intra-op Monitoring Plan: Standard ASA Monitors  Post Op Pain Control Plan: multimodal analgesia  Induction:  IV  Informed Consent: Informed consent signed with  the Patient and all parties understand the risks and agree with anesthesia plan.  All questions answered.   ASA Score: 2  Anesthesia Plan Notes: Anesthesia consent to be signed prior to surgery 8/19/24      Ready For Surgery From Anesthesia Perspective.     .

## 2024-07-31 NOTE — PRE-PROCEDURE INSTRUCTIONS
Linda Rhodes       Allergies, medical, surgical, family and psychosocial histories reviewed with patient. Periop plan of care reviewed. Preop instructions given, including medications to take and to hold. Hibiclens soap and instructions on use given. Time allotted for questions to be addressed.      Arrival time 1015.

## 2024-07-31 NOTE — DISCHARGE INSTRUCTIONS

## 2024-07-31 NOTE — H&P (VIEW-ONLY)
Clinic Note  [facility]    Subjective:     Edmar Benitez is a 64 y.o. year old who presents to clinic today for pre-op clearance.    EKG 7/9/24: Sinus bradycardia   Otherwise normal ECG   When compared with ECG of 25-SEP-2009 12:07,   No significant change was found    XR 7/9/24: Normal      Review of Systems negative except for symptoms mentioned in HPI      Health Maintenance         Date Due Completion Date    Hepatitis C Screening Never done ---    Lipid Panel Never done ---    HIV Screening Never done ---    Colorectal Cancer Screening Never done ---    RSV Vaccine (Age 60+ and Pregnant patients) (1 - 1-dose 60+ series) Never done ---    COVID-19 Vaccine (4 - 2023-24 season) 09/01/2023 11/14/2022    Pneumococcal Vaccines (Age 65+) (1 of 1 - PCV) Never done ---    Influenza Vaccine (1) 09/01/2024 12/11/2023    Hemoglobin A1c (Diabetic Prevention Screening) 07/09/2027 7/9/2024    TETANUS VACCINE 06/04/2032 6/4/2022            No past medical history on file.    No past surgical history on file.    No family history on file.    Social History     Socioeconomic History    Marital status:    Tobacco Use    Smoking status: Unknown   Substance and Sexual Activity    Alcohol use: Never    Drug use: Never       Current Outpatient Medications   Medication Sig Dispense Refill    amLODIPine (NORVASC) 10 MG tablet 10 mg.      buPROPion (WELLBUTRIN XL) 150 MG TB24 tablet Take 1 tablet by mouth once daily.      calcium citrate-vitamin D3 315-200 mg (CITRACAL+D) 315 mg-5 mcg (200 unit) per tablet Take 1 tablet by mouth 2 (two) times daily.      diclofenac sodium (VOLTAREN) 1 % Gel Apply topically.      empagliflozin (JARDIANCE) 25 mg tablet Take 1 tablet by mouth every morning.      ezetimibe (ZETIA) 10 mg tablet 10 mg.      fluticasone propionate (FLONASE) 50 mcg/actuation nasal spray INSTILL 1 SPRAY IN EACH NOSTRIL EVERY DAY AS NEEDED FOR ALLERGIES      Lactobacillus acidophilus Chew Take by mouth.       meloxicam (MOBIC) 7.5 MG tablet 7.5 mg.      metFORMIN (GLUCOPHAGE) 500 MG tablet Take 1,000 mg by mouth.      tamsulosin (FLOMAX) 0.4 mg Cap 0.4 mg.      traZODone (DESYREL) 100 MG tablet Take 100 mg by mouth.      brimonidine (LUMIFY) 0.025 % Drop Apply 1 drop to eye every 8 (eight) hours as needed. 2.5 mL 3    ergocalciferol (ERGOCALCIFEROL) 50,000 unit Cap TAKE 50,000 UNITS (1,250MCG) BY MOUTH EVERY WEEK AS A VITAMIN SUPPLEMENT (Patient not taking: Reported on 7/31/2024)      loratadine (CLARITIN) 10 mg tablet 10 mg. (Patient not taking: Reported on 7/31/2024)       No current facility-administered medications for this visit.       Review of patient's allergies indicates:   Allergen Reactions    Milk     Acetaminophen Nausea And Vomiting         Objective:     Vitals:    07/31/24 1310   BP: 125/79   Pulse: 77       Wt Readings from Last 1 Encounters:   07/31/24 105.2 kg (231 lb 14.8 oz)       Physical Exam  Constitutional:       General: He is not in acute distress.  Eyes:      General:         Right eye: No discharge.         Left eye: No discharge.      Conjunctiva/sclera: Conjunctivae normal.   Cardiovascular:      Rate and Rhythm: Normal rate.      Pulses: Normal pulses.   Pulmonary:      Effort: Pulmonary effort is normal. No respiratory distress.   Abdominal:      General: There is no distension.   Musculoskeletal:      Right lower leg: No edema.      Left lower leg: No edema.   Skin:     General: Skin is warm.      Coloration: Skin is not jaundiced.   Neurological:      Mental Status: He is alert and oriented to person, place, and time.      Gait: Gait normal.   Psychiatric:         Mood and Affect: Mood normal.         Thought Content: Thought content normal.         Assessment/Plan:     There are no diagnoses linked to this encounter.    Pre-operative evaluation with risk assessment              -           Scheduled for L TKA on 8/19/24 by Dr. Wiley  -           DASI score: 33.95 w/ Functional Capacity  "in METS: 6.91 (>4) with a revised cardiac risk index of 3.9% - Class I RIsk.    - he  is not on antiplatelets/anticoagulation.   - he does not have a history of blood dyscrasias or previous reaction to anesthesia   - he is a FORMER SMOKER - quit 1985, pack years 11 .  - he does have a prior h/o HTN. BP: 125/79.   - he does have a prior h/o HLD/CAD w/ either MI or CVA. See below for latest ASCVD if all necessary values are available. Continue medical management w/ Zetia 10mg.  - he does have a prior h/o DM. Currently taking Jardiance. See below for last A1C.  - he does not have a prior h/o thyroid disease. See below for last TSH.   - he does have a prior h/o COPD/Asthma/MARIELA/[facility]. does not use CPAP. does not have changes from baseline function.   - he does not have a prior h/o HF. Echo: Never done.  - BMI: Body mass index is 28.23 kg/m².               -           The patient is medically optimized with a low to moderate risk to proceed with (per ACC/AHA kahlil-operative guidelines) a moderate risk surgery.   - Please call our office for any additional questions or concerns.    The ASCVD Risk score (Reilly DK, et al., 2019) failed to calculate for the following reasons:    Cannot find a previous HDL lab    Cannot find a previous total cholesterol lab    The smoking status is invalid  Lab Results   Component Value Date    HGBA1C 6.6 (H) 07/09/2024      No results found for: "TSH"       Case discussed with staff: Dr. Irena Zhu MD PGY-2  \[facility] [place] [facility]\"" Family Medicine   07/31/2024 1:08 PM   "

## 2024-07-31 NOTE — PROGRESS NOTES
Clinic Note  [facility]    Subjective:     Edmar Benitez is a 64 y.o. year old who presents to clinic today for pre-op clearance.    EKG 7/9/24: Sinus bradycardia   Otherwise normal ECG   When compared with ECG of 25-SEP-2009 12:07,   No significant change was found    XR 7/9/24: Normal      Review of Systems negative except for symptoms mentioned in HPI      Health Maintenance         Date Due Completion Date    Hepatitis C Screening Never done ---    Lipid Panel Never done ---    HIV Screening Never done ---    Colorectal Cancer Screening Never done ---    RSV Vaccine (Age 60+ and Pregnant patients) (1 - 1-dose 60+ series) Never done ---    COVID-19 Vaccine (4 - 2023-24 season) 09/01/2023 11/14/2022    Pneumococcal Vaccines (Age 65+) (1 of 1 - PCV) Never done ---    Influenza Vaccine (1) 09/01/2024 12/11/2023    Hemoglobin A1c (Diabetic Prevention Screening) 07/09/2027 7/9/2024    TETANUS VACCINE 06/04/2032 6/4/2022            No past medical history on file.    No past surgical history on file.    No family history on file.    Social History     Socioeconomic History    Marital status:    Tobacco Use    Smoking status: Unknown   Substance and Sexual Activity    Alcohol use: Never    Drug use: Never       Current Outpatient Medications   Medication Sig Dispense Refill    amLODIPine (NORVASC) 10 MG tablet 10 mg.      buPROPion (WELLBUTRIN XL) 150 MG TB24 tablet Take 1 tablet by mouth once daily.      calcium citrate-vitamin D3 315-200 mg (CITRACAL+D) 315 mg-5 mcg (200 unit) per tablet Take 1 tablet by mouth 2 (two) times daily.      diclofenac sodium (VOLTAREN) 1 % Gel Apply topically.      empagliflozin (JARDIANCE) 25 mg tablet Take 1 tablet by mouth every morning.      ezetimibe (ZETIA) 10 mg tablet 10 mg.      fluticasone propionate (FLONASE) 50 mcg/actuation nasal spray INSTILL 1 SPRAY IN EACH NOSTRIL EVERY DAY AS NEEDED FOR ALLERGIES      Lactobacillus acidophilus Chew Take by mouth.       meloxicam (MOBIC) 7.5 MG tablet 7.5 mg.      metFORMIN (GLUCOPHAGE) 500 MG tablet Take 1,000 mg by mouth.      tamsulosin (FLOMAX) 0.4 mg Cap 0.4 mg.      traZODone (DESYREL) 100 MG tablet Take 100 mg by mouth.      brimonidine (LUMIFY) 0.025 % Drop Apply 1 drop to eye every 8 (eight) hours as needed. 2.5 mL 3    ergocalciferol (ERGOCALCIFEROL) 50,000 unit Cap TAKE 50,000 UNITS (1,250MCG) BY MOUTH EVERY WEEK AS A VITAMIN SUPPLEMENT (Patient not taking: Reported on 7/31/2024)      loratadine (CLARITIN) 10 mg tablet 10 mg. (Patient not taking: Reported on 7/31/2024)       No current facility-administered medications for this visit.       Review of patient's allergies indicates:   Allergen Reactions    Milk     Acetaminophen Nausea And Vomiting         Objective:     Vitals:    07/31/24 1310   BP: 125/79   Pulse: 77       Wt Readings from Last 1 Encounters:   07/31/24 105.2 kg (231 lb 14.8 oz)       Physical Exam  Constitutional:       General: He is not in acute distress.  Eyes:      General:         Right eye: No discharge.         Left eye: No discharge.      Conjunctiva/sclera: Conjunctivae normal.   Cardiovascular:      Rate and Rhythm: Normal rate.      Pulses: Normal pulses.   Pulmonary:      Effort: Pulmonary effort is normal. No respiratory distress.   Abdominal:      General: There is no distension.   Musculoskeletal:      Right lower leg: No edema.      Left lower leg: No edema.   Skin:     General: Skin is warm.      Coloration: Skin is not jaundiced.   Neurological:      Mental Status: He is alert and oriented to person, place, and time.      Gait: Gait normal.   Psychiatric:         Mood and Affect: Mood normal.         Thought Content: Thought content normal.         Assessment/Plan:     There are no diagnoses linked to this encounter.    Pre-operative evaluation with risk assessment              -           Scheduled for L TKA on 8/19/24 by Dr. Wiley  -           DASI score: 33.95 w/ Functional Capacity  "in METS: 6.91 (>4) with a revised cardiac risk index of 3.9% - Class I RIsk.    - he  is not on antiplatelets/anticoagulation.   - he does not have a history of blood dyscrasias or previous reaction to anesthesia   - he is a FORMER SMOKER - quit 1985, pack years 11 .  - he does have a prior h/o HTN. BP: 125/79.   - he does have a prior h/o HLD/CAD w/ either MI or CVA. See below for latest ASCVD if all necessary values are available. Continue medical management w/ Zetia 10mg.  - he does have a prior h/o DM. Currently taking Jardiance. See below for last A1C.  - he does not have a prior h/o thyroid disease. See below for last TSH.   - he does have a prior h/o COPD/Asthma/MARIELA/OHS. does not use CPAP. does not have changes from baseline function.   - he does not have a prior h/o HF. Echo: Never done.  - BMI: Body mass index is 28.23 kg/m².               -           The patient is medically optimized with a low to moderate risk to proceed with (per ACC/AHA kahlil-operative guidelines) a moderate risk surgery.   - Please call our office for any additional questions or concerns.    The ASCVD Risk score (Reilly DK, et al., 2019) failed to calculate for the following reasons:    Cannot find a previous HDL lab    Cannot find a previous total cholesterol lab    The smoking status is invalid  Lab Results   Component Value Date    HGBA1C 6.6 (H) 07/09/2024      No results found for: "TSH"       Case discussed with staff: Dr. Irena Zhu MD PGY-2  [facility]   07/31/2024 1:08 PM   "

## 2024-08-15 ENCOUNTER — RESEARCH ENCOUNTER (OUTPATIENT)
Dept: RESEARCH | Facility: HOSPITAL | Age: 64
End: 2024-08-15
Payer: OTHER GOVERNMENT

## 2024-08-15 ENCOUNTER — PROCEDURE VISIT (OUTPATIENT)
Dept: ORTHOPEDICS | Facility: CLINIC | Age: 64
End: 2024-08-15
Payer: OTHER GOVERNMENT

## 2024-08-15 VITALS — WEIGHT: 231.94 LBS | HEIGHT: 76 IN | BODY MASS INDEX: 28.25 KG/M2

## 2024-08-15 DIAGNOSIS — M62.81 QUADRICEPS WEAKNESS: ICD-10-CM

## 2024-08-15 DIAGNOSIS — G89.29 CHRONIC PAIN OF LEFT KNEE: ICD-10-CM

## 2024-08-15 DIAGNOSIS — F32.A DEPRESSION, UNSPECIFIED DEPRESSION TYPE: ICD-10-CM

## 2024-08-15 DIAGNOSIS — M25.562 CHRONIC PAIN OF LEFT KNEE: ICD-10-CM

## 2024-08-15 DIAGNOSIS — M17.12 PRIMARY OSTEOARTHRITIS OF LEFT KNEE: ICD-10-CM

## 2024-08-15 PROCEDURE — 64640 INJECTION TREATMENT OF NERVE: CPT | Mod: S$PBB,LT,, | Performed by: ORTHOPAEDIC SURGERY

## 2024-08-15 PROCEDURE — 99499 UNLISTED E&M SERVICE: CPT | Mod: S$PBB,,, | Performed by: ORTHOPAEDIC SURGERY

## 2024-08-15 PROCEDURE — 64640 INJECTION TREATMENT OF NERVE: CPT | Mod: PBBFAC,PN | Performed by: ORTHOPAEDIC SURGERY

## 2024-08-15 NOTE — PROCEDURES
Procedure Note Iovera:    DATE OF PROCEDURE:  08/15/2024    PREOPERATIVE DIAGNOSIS: left knee pain.     POSTOPERATIVE DIAGNOSIS: left knee pain.     PROCEDURE: Iovera treatment of anterior femoral cutaneous nerve, Lateral femoral cut nerve, and both branches of infrapatellar saphenous nerve using at least 4 different punctures to treat all 4 nerves. (cpt 64640 x4)    ATTENDING SURGEON: Obed Wiley M.D.   ASSISTANT: mag Tafoya    COMPLICATIONS: None.     IMPLANTS:  None    ESTIMATED BLOOD LOSS:  < 5cc    SPECIMENS REMOVED:  None    ANESTHESIA: Local lidocaine    INDICATIONS FOR PROCEDURE: This is a 64 y.o. male with longstanding knee pain. They have failed non operative management including injections.I discussed a new treatment therapy called Iovera, which is cryotherapy, to provide symptomatic relief along the sensory distribution of the infrapatellar tendon branch of the saphenous nerve  and AFCN. The patient elected to move forward with this  We did discuss the fact that this is a fairly novel procedure and there is very limited scientific data around this.  However, it FDA approved.  The patient was given patient information and literature to review prior to the procedure as well.  Based on this, the patient agreed to move forward with doing the procedure.      PROCEDURE:    The patient was placed supine on the exam table and the proximal medial aspect of the left tibia and anterior aspect of distal femur was prepped with sterile Betadine and alcohol.  A line was drawn extending approximately 5 cm medial to inferior pole of the patella distally to a point approximately 5 cm medial to the tibial tubercle.  A second line was drawn in a medial to lateral direction the width of the patella approximately 7 cm proximal to the patella. We then infiltrated the skin with lidocaine along both lines using a 25g needle. We then introduced the Iovera device along these lines and this device penetrated the skin, creating  cryotherapy to both branches of the infrapatellar saphenous nerve, a third treatment to the anterior femoral cutaneous nerve, and fourth LFCN. 7 punctures of the skin were made to treat the 2 branches of the ISN and another 7 punctures were made to treat the AFCN and LFCN. There were a total of 4 nerves treated with iovera. The patient tolerated the procedure well with no problems.

## 2024-08-15 NOTE — PROGRESS NOTES
Protocol: Innovations in Genicular Outcomes Registry ([place])  Protocol#:[place]re  IRB# 2021.156  Version Date: 10-Adiel-2023  PI: Obed Wiley MD  Patient Initials: HEVER  (Study ID# [id number])       Treatment Visit     Research staff approached patient in private clinic room prior to OA injection. Patient awake, alert. Mood and affect appropriate to situation. Patient stated willingness to continue participation in above study. Patient states pain for target knee is 5 on pain scale.  Dr. Wiley supervised the administration of treatment to patient by Resident. Resident administered Iovera to the patient's target knee (LEFT).  Patient tolerated procedure well with no immediate complications. No analgesic medication prescribed after the treatment.     Research staff discussed the daily questionnaires that start tomorrow. Patient stated understanding and said he will complete the questionnaires as soon as the e-mail reminder is received. Patient reports post procedure pain of target knee as a 2 on pain scale. Patient instructed to call Dr. Wiley or research team with any questions or concerns.       LEFT TKA scheduled for this Monday.

## 2024-08-15 NOTE — PROGRESS NOTES
Protocol: innovations in Genicular Outcomes Registry ([place])  Protocol#: [place]  IRB#: 2021.156  Version Date: 10-Adiel-2023  PI: Obed Wiley MD  Patient Initials: HEVER   (Study ID#: [id number])    Screening Note:    Patient reports the following information during screening visit.    Demographics:  male  Age: 64  Black or -American  Non-    Social Hx:  Marital Status:   Former smoker  Hx of Alcohol Abuse or dependency: No  Hx of Opioid abuse: No  Hx of Illicit drug abuse: No    Economic hx:  Current Work Status: Retired  # of days missed in the past 30 days due to OA affected knee: none   Status: Yes  Level of physical activity during last 12 months: Light  Highest Level of Education Obtained: Some College    Medical Hx:  Target Knee for study: left   OA Severity according to patient: severe  Year OA was Dx on target knee: 2007  allergies and past medical history  Active Ambulatory Problems     Diagnosis Date Noted    Primary osteoarthritis of left knee 04/02/2024    Chronic pain of left knee 04/02/2024    Quadriceps weakness 07/09/2024    Depression 07/09/2024    Prediabetes      High blood pressure/hypertension       Kidney disease     Hyperlipidemia      Lower back pain     Neck pain      Resolved Ambulatory Problems     Diagnosis Date Noted    No Resolved Ambulatory Problems     No Additional Past Medical History       Surgical Hx:   Surgical Procedure   Date    Canalicular Eyelid Laceration Repair, Left Lower Eyelid  Staff Physician: Adore Pereira MD  Resident Physician:  Edmar Bolaños MD  Left 6/06/2022                 OA Treatment Hx:  Did pt receive steroid injections (intra-articular) for knee OA within last 12 months?  No  Did the patient receive viscosupplementation within last 12 months? No  Is there a hx of cryoanalgesia or radio-frequency ablation tx for the affected knee within last 12 months? No  Herbal therapy or supplements within last 12 months for OA knee pain?  Yes  Medical marijuana use within last 12 months for OA pain? No  Hx of acupuncture within last 12 months for OA pain? No  Knee brace use within last 12 months for OA pain? No  Physical therapy within last 12 months for OA knee pain? No  Did the patient receive any treatments (other than the above) for Knee OA within the last 12 months? Yes (Toradol injection)    Prior and Current OA Medications/ Any Analgesic Medications used in the last three months prior to enrollment: (Per study sponsor list)  Patient states the following medications were taken:    Mobic for OA knee pain  DMSO liquid/gel for OA knee pain  Motrin for OA knee pain

## 2024-08-15 NOTE — PROGRESS NOTES
Protocol: innovations in Genicular Outcomes Registry ([place])  Protocol#: [facility]re  IRB#: 2021.156  Version Date: 10-Adiel-2023  PI: Obed Wiley MD  Patient Initials: [email or web address]     Informed Consent Process Note:     Research coordinator met with patient, in private clinic room, to discuss above mentioned protocol. Patient is alert and oriented x 3. Mood and affect appropriate to the situation. Patient states that he is not participating in any other research studies. Patient states understanding about the diagnosis of osteoarthritis of the knee and of the procedure to being done on that knee.  Purpose of study reviewed with the patient.  Patient states understanding of this information.   Length of study and number of participants reviewed with patient; patient states understanding of the length of the study.   Study procedure discussed in detail with patient. Patient states understanding of this information.  Potential benefits of study along with costs and/or payment reimbursement per insurance discussed with patient; Patient states understanding that insurance will cover cost of all standard of care medications and procedures. Patient aware of $20 payment for qualifying visits with completed questionnaires.  Alternative treatment methods discussed with patient; Patient states understanding of this information.   Study related questions/compensation for injury, and whom to contact regarding rights as a research subject all reviewed with patient; Patient verbalizes understanding of this information.   Voluntary participation and withdrawal from research stressed to patient; patient states understanding that he may withdraw consent at any time without compromise to care.   Confidentiality and HIPAA discussed with patient. Patient verbalizes understanding of this information.      Patient states his willingness to participate in study; [employer] eligibility confirmed and [employer] link send to patient's e-mail address. Patient  instructed to read informed consent in its entirety, initial at the bottom of each page, then sign the last page via their smart device or computer and submit. Throughout consenting process, patient given several opportunities to ask questions; all questions addressed and answered to patient's satisfaction.      Patient signed eConsent and research team completed the staff portion. Patient completed all baseline/screening questionnaires prior to any procedures.    Patient given [place]. Patient aware it may take 24-48 hrs for the funds to appear on the card balance.

## 2024-08-15 NOTE — PROGRESS NOTES
Protocol: innovations in Genicular Outcomes Registry ([place])  Protocol#: [place]re  IRB#: 2021.156  Version Date: 10-Adiel-2023  PI: Obed Wiley MD  Patient Initials: N.G.     Eligibility Note:      - Inclusion Criteria  1. Planned to receive treatment for knee OA pain including, but not limited to, knee injections, nerve blocking  procedures, or knee arthroplasty within 60 days of screening yes, scheduled for Iovera for the Right knee on 8/15/2024 in preparation for RT TKA on Monday.   2. Able to understand the informed consent and the assessment questionnaires and have the ability to complete  them in the opinion of the investigator: yes  3. Have access to a smartphone or internet access with a computer/tablet to complete the questionnaires using  the registry application: yes  4. KL Score according to Dr. Wiley: 4    7.4 Exclusion Criteria  1. Actively enrolled in an investigational trial that would preclude patients from receiving the sites standard  of care for knee OA pain or knee arthroplasty recovery protocol: no   2. Planning to have a surgery other than on the target knee: no

## 2024-08-19 ENCOUNTER — ANESTHESIA (OUTPATIENT)
Dept: SURGERY | Facility: HOSPITAL | Age: 64
End: 2024-08-19
Payer: OTHER GOVERNMENT

## 2024-08-19 ENCOUNTER — HOSPITAL ENCOUNTER (OUTPATIENT)
Facility: HOSPITAL | Age: 64
Discharge: HOME | End: 2024-08-19
Attending: ORTHOPAEDIC SURGERY | Admitting: ORTHOPAEDIC SURGERY
Payer: OTHER GOVERNMENT

## 2024-08-19 ENCOUNTER — RESEARCH ENCOUNTER (OUTPATIENT)
Dept: RESEARCH | Facility: HOSPITAL | Age: 64
End: 2024-08-19
Payer: OTHER GOVERNMENT

## 2024-08-19 DIAGNOSIS — M17.10 ARTHRITIS OF KNEE: ICD-10-CM

## 2024-08-19 DIAGNOSIS — M17.12 PRIMARY OSTEOARTHRITIS OF LEFT KNEE: Primary | ICD-10-CM

## 2024-08-19 LAB
APPEARANCE FLD: CLEAR
BODY FLD TYPE: NORMAL
COLOR FLD: YELLOW
LYMPHOCYTES NFR FLD MANUAL: 50 %
MONOS+MACROS NFR FLD MANUAL: 50 %
POCT GLUCOSE: 122 MG/DL (ref 70–110)
WBC # FLD: 160 /CU MM

## 2024-08-19 PROCEDURE — 97161 PT EVAL LOW COMPLEX 20 MIN: CPT

## 2024-08-19 PROCEDURE — 63600175 PHARM REV CODE 636 W HCPCS

## 2024-08-19 PROCEDURE — 20985 CPTR-ASST DIR MS PX: CPT | Mod: ,,, | Performed by: ORTHOPAEDIC SURGERY

## 2024-08-19 PROCEDURE — 27200671 HC STIMUCATH NEEDLE/ CATHETER: Performed by: ANESTHESIOLOGY

## 2024-08-19 PROCEDURE — 27447 TOTAL KNEE ARTHROPLASTY: CPT | Mod: 58,LT,, | Performed by: ORTHOPAEDIC SURGERY

## 2024-08-19 PROCEDURE — 71000015 HC POSTOP RECOV 1ST HR: Performed by: ORTHOPAEDIC SURGERY

## 2024-08-19 PROCEDURE — 25000003 PHARM REV CODE 250

## 2024-08-19 PROCEDURE — 36000710: Performed by: ORTHOPAEDIC SURGERY

## 2024-08-19 PROCEDURE — 36000711: Performed by: ORTHOPAEDIC SURGERY

## 2024-08-19 PROCEDURE — 63600175 PHARM REV CODE 636 W HCPCS: Mod: JZ,JG | Performed by: ANESTHESIOLOGY

## 2024-08-19 PROCEDURE — C9290 INJ, BUPIVACAINE LIPOSOME: HCPCS | Performed by: ANESTHESIOLOGY

## 2024-08-19 PROCEDURE — C1713 ANCHOR/SCREW BN/BN,TIS/BN: HCPCS | Performed by: ORTHOPAEDIC SURGERY

## 2024-08-19 PROCEDURE — 51798 US URINE CAPACITY MEASURE: CPT | Performed by: ORTHOPAEDIC SURGERY

## 2024-08-19 PROCEDURE — 25000003 PHARM REV CODE 250: Performed by: ORTHOPAEDIC SURGERY

## 2024-08-19 PROCEDURE — 97530 THERAPEUTIC ACTIVITIES: CPT

## 2024-08-19 PROCEDURE — C1776 JOINT DEVICE (IMPLANTABLE): HCPCS | Performed by: ORTHOPAEDIC SURGERY

## 2024-08-19 PROCEDURE — 27201121 HC TRAY,SPINAL-HYPERBARIC: Performed by: ANESTHESIOLOGY

## 2024-08-19 PROCEDURE — 97535 SELF CARE MNGMENT TRAINING: CPT

## 2024-08-19 PROCEDURE — A4247 BETADINE/IODINE SWABS/WIPES: HCPCS

## 2024-08-19 PROCEDURE — 71000033 HC RECOVERY, INTIAL HOUR: Performed by: ORTHOPAEDIC SURGERY

## 2024-08-19 PROCEDURE — 37000008 HC ANESTHESIA 1ST 15 MINUTES: Performed by: ORTHOPAEDIC SURGERY

## 2024-08-19 PROCEDURE — 63600175 PHARM REV CODE 636 W HCPCS: Mod: JG | Performed by: ORTHOPAEDIC SURGERY

## 2024-08-19 PROCEDURE — 97165 OT EVAL LOW COMPLEX 30 MIN: CPT

## 2024-08-19 PROCEDURE — 27201423 OPTIME MED/SURG SUP & DEVICES STERILE SUPPLY: Performed by: ORTHOPAEDIC SURGERY

## 2024-08-19 PROCEDURE — 97116 GAIT TRAINING THERAPY: CPT

## 2024-08-19 PROCEDURE — 64447 NJX AA&/STRD FEMORAL NRV IMG: CPT | Performed by: ANESTHESIOLOGY

## 2024-08-19 PROCEDURE — 89051 BODY FLUID CELL COUNT: CPT | Performed by: ORTHOPAEDIC SURGERY

## 2024-08-19 PROCEDURE — 71000039 HC RECOVERY, EACH ADD'L HOUR: Performed by: ORTHOPAEDIC SURGERY

## 2024-08-19 PROCEDURE — 37000009 HC ANESTHESIA EA ADD 15 MINS: Performed by: ORTHOPAEDIC SURGERY

## 2024-08-19 PROCEDURE — 71000016 HC POSTOP RECOV ADDL HR: Performed by: ORTHOPAEDIC SURGERY

## 2024-08-19 DEVICE — IMPLANTABLE DEVICE
Type: IMPLANTABLE DEVICE | Site: KNEE | Status: FUNCTIONAL
Brand: PERSONA® TRABECULAR METAL®

## 2024-08-19 RX ORDER — SODIUM CHLORIDE, SODIUM LACTATE, POTASSIUM CHLORIDE, CALCIUM CHLORIDE 600; 310; 30; 20 MG/100ML; MG/100ML; MG/100ML; MG/100ML
INJECTION, SOLUTION INTRAVENOUS CONTINUOUS
Status: DISCONTINUED | OUTPATIENT
Start: 2024-08-19 | End: 2024-08-19 | Stop reason: HOSPADM

## 2024-08-19 RX ORDER — ASPIRIN 81 MG/1
81 TABLET ORAL 2 TIMES DAILY
Qty: 84 TABLET | Refills: 0 | Status: SHIPPED | OUTPATIENT
Start: 2024-08-19 | End: 2024-09-30

## 2024-08-19 RX ORDER — ACETAMINOPHEN 500 MG
1000 TABLET ORAL ONCE
Status: DISCONTINUED | OUTPATIENT
Start: 2024-08-19 | End: 2024-08-19 | Stop reason: HOSPADM

## 2024-08-19 RX ORDER — BUPIVACAINE HYDROCHLORIDE 2.5 MG/ML
INJECTION, SOLUTION INFILTRATION; PERINEURAL
Status: DISCONTINUED | OUTPATIENT
Start: 2024-08-19 | End: 2024-08-19 | Stop reason: HOSPADM

## 2024-08-19 RX ORDER — LIDOCAINE HYDROCHLORIDE 20 MG/ML
INJECTION INTRAVENOUS
Status: DISCONTINUED | OUTPATIENT
Start: 2024-08-19 | End: 2024-08-19

## 2024-08-19 RX ORDER — TRANEXAMIC ACID 100 MG/ML
INJECTION, SOLUTION INTRAVENOUS
Status: DISCONTINUED | OUTPATIENT
Start: 2024-08-19 | End: 2024-08-19 | Stop reason: HOSPADM

## 2024-08-19 RX ORDER — PROPOFOL 10 MG/ML
VIAL (ML) INTRAVENOUS CONTINUOUS PRN
Status: DISCONTINUED | OUTPATIENT
Start: 2024-08-19 | End: 2024-08-19

## 2024-08-19 RX ORDER — TRANEXAMIC ACID 650 MG/1
650 TABLET ORAL 2 TIMES DAILY
Qty: 28 TABLET | Refills: 0 | Status: SHIPPED | OUTPATIENT
Start: 2024-08-19 | End: 2024-09-02

## 2024-08-19 RX ORDER — PROMETHAZINE HYDROCHLORIDE 25 MG/ML
6.25 INJECTION, SOLUTION INTRAMUSCULAR; INTRAVENOUS EVERY 6 HOURS PRN
Status: DISCONTINUED | OUTPATIENT
Start: 2024-08-19 | End: 2024-08-19 | Stop reason: HOSPADM

## 2024-08-19 RX ORDER — NAPROXEN SODIUM 220 MG/1
81 TABLET, FILM COATED ORAL 2 TIMES DAILY
Status: DISCONTINUED | OUTPATIENT
Start: 2024-08-19 | End: 2024-08-19 | Stop reason: HOSPADM

## 2024-08-19 RX ORDER — ACETAMINOPHEN 325 MG/1
650 TABLET ORAL EVERY 6 HOURS
Status: DISCONTINUED | OUTPATIENT
Start: 2024-08-19 | End: 2024-08-19 | Stop reason: HOSPADM

## 2024-08-19 RX ORDER — CEFAZOLIN SODIUM 2 G/50ML
2 SOLUTION INTRAVENOUS ONCE
Status: COMPLETED | OUTPATIENT
Start: 2024-08-19 | End: 2024-08-19

## 2024-08-19 RX ORDER — CEFAZOLIN SODIUM 2 G/50ML
2 SOLUTION INTRAVENOUS EVERY 8 HOURS
Status: DISCONTINUED | OUTPATIENT
Start: 2024-08-19 | End: 2024-08-19 | Stop reason: HOSPADM

## 2024-08-19 RX ORDER — DEXAMETHASONE SODIUM PHOSPHATE 10 MG/ML
10 INJECTION INTRAMUSCULAR; INTRAVENOUS ONCE
Status: DISCONTINUED | OUTPATIENT
Start: 2024-08-19 | End: 2024-08-19 | Stop reason: HOSPADM

## 2024-08-19 RX ORDER — PREGABALIN 75 MG/1
75 CAPSULE ORAL 2 TIMES DAILY
Status: DISCONTINUED | OUTPATIENT
Start: 2024-08-19 | End: 2024-08-19 | Stop reason: HOSPADM

## 2024-08-19 RX ORDER — LIDOCAINE HYDROCHLORIDE 10 MG/ML
1 INJECTION, SOLUTION EPIDURAL; INFILTRATION; INTRACAUDAL; PERINEURAL ONCE
Status: DISCONTINUED | OUTPATIENT
Start: 2024-08-19 | End: 2024-08-19 | Stop reason: HOSPADM

## 2024-08-19 RX ORDER — ONDANSETRON HYDROCHLORIDE 2 MG/ML
INJECTION, SOLUTION INTRAVENOUS
Status: DISCONTINUED | OUTPATIENT
Start: 2024-08-19 | End: 2024-08-19

## 2024-08-19 RX ORDER — CEPHALEXIN 500 MG/1
500 CAPSULE ORAL EVERY 6 HOURS
Qty: 28 CAPSULE | Refills: 0 | Status: SHIPPED | OUTPATIENT
Start: 2024-08-19 | End: 2024-08-19

## 2024-08-19 RX ORDER — ONDANSETRON HYDROCHLORIDE 2 MG/ML
4 INJECTION, SOLUTION INTRAVENOUS EVERY 12 HOURS PRN
Status: DISCONTINUED | OUTPATIENT
Start: 2024-08-19 | End: 2024-08-19 | Stop reason: HOSPADM

## 2024-08-19 RX ORDER — ACETAMINOPHEN 10 MG/ML
INJECTION, SOLUTION INTRAVENOUS
Status: DISCONTINUED | OUTPATIENT
Start: 2024-08-19 | End: 2024-08-19

## 2024-08-19 RX ORDER — DICLOFENAC SODIUM 75 MG/1
75 TABLET, DELAYED RELEASE ORAL 2 TIMES DAILY
Qty: 84 TABLET | Refills: 0 | Status: SHIPPED | OUTPATIENT
Start: 2024-08-19 | End: 2024-08-31 | Stop reason: SDUPTHER

## 2024-08-19 RX ORDER — TRANEXAMIC ACID 10 MG/ML
1000 INJECTION, SOLUTION INTRAVENOUS ONCE
Status: DISCONTINUED | OUTPATIENT
Start: 2024-08-19 | End: 2024-08-19 | Stop reason: HOSPADM

## 2024-08-19 RX ORDER — PROPOFOL 10 MG/ML
VIAL (ML) INTRAVENOUS
Status: DISCONTINUED | OUTPATIENT
Start: 2024-08-19 | End: 2024-08-19

## 2024-08-19 RX ORDER — TALC
6 POWDER (GRAM) TOPICAL NIGHTLY PRN
Status: DISCONTINUED | OUTPATIENT
Start: 2024-08-19 | End: 2024-08-19 | Stop reason: HOSPADM

## 2024-08-19 RX ORDER — TRANEXAMIC ACID 100 MG/ML
INJECTION, SOLUTION INTRAVENOUS
Status: DISCONTINUED | OUTPATIENT
Start: 2024-08-19 | End: 2024-08-19

## 2024-08-19 RX ORDER — DIPHENHYDRAMINE HCL 25 MG
25 CAPSULE ORAL EVERY 6 HOURS PRN
Status: DISCONTINUED | OUTPATIENT
Start: 2024-08-19 | End: 2024-08-19 | Stop reason: HOSPADM

## 2024-08-19 RX ORDER — CELECOXIB 100 MG/1
200 CAPSULE ORAL 2 TIMES DAILY
Status: DISCONTINUED | OUTPATIENT
Start: 2024-08-19 | End: 2024-08-19 | Stop reason: HOSPADM

## 2024-08-19 RX ORDER — POVIDONE-IODINE 10 %
SOLUTION, NON-ORAL TOPICAL
Status: DISCONTINUED | OUTPATIENT
Start: 2024-08-19 | End: 2024-08-19 | Stop reason: HOSPADM

## 2024-08-19 RX ORDER — TRANEXAMIC ACID 650 MG/1
1950 TABLET ORAL ONCE
Status: COMPLETED | OUTPATIENT
Start: 2024-08-19 | End: 2024-08-19

## 2024-08-19 RX ORDER — CELECOXIB 100 MG/1
400 CAPSULE ORAL ONCE
Status: COMPLETED | OUTPATIENT
Start: 2024-08-19 | End: 2024-08-19

## 2024-08-19 RX ORDER — BUPIVACAINE HYDROCHLORIDE 2.5 MG/ML
INJECTION, SOLUTION EPIDURAL; INFILTRATION; INTRACAUDAL
Status: DISCONTINUED | OUTPATIENT
Start: 2024-08-19 | End: 2024-08-19

## 2024-08-19 RX ORDER — FAMOTIDINE 20 MG/1
20 TABLET, FILM COATED ORAL 2 TIMES DAILY
Qty: 84 TABLET | Refills: 0 | Status: SHIPPED | OUTPATIENT
Start: 2024-08-19 | End: 2024-09-30

## 2024-08-19 RX ORDER — FAMOTIDINE 20 MG/1
20 TABLET, FILM COATED ORAL 2 TIMES DAILY
Status: DISCONTINUED | OUTPATIENT
Start: 2024-08-19 | End: 2024-08-19 | Stop reason: HOSPADM

## 2024-08-19 RX ORDER — PREGABALIN 75 MG/1
75 CAPSULE ORAL ONCE
Status: COMPLETED | OUTPATIENT
Start: 2024-08-19 | End: 2024-08-19

## 2024-08-19 RX ORDER — AMOXICILLIN 250 MG
1 CAPSULE ORAL 2 TIMES DAILY
Status: DISCONTINUED | OUTPATIENT
Start: 2024-08-19 | End: 2024-08-19 | Stop reason: HOSPADM

## 2024-08-19 RX ORDER — PHENYLEPHRINE HYDROCHLORIDE 10 MG/ML
INJECTION INTRAVENOUS
Status: DISCONTINUED | OUTPATIENT
Start: 2024-08-19 | End: 2024-08-19

## 2024-08-19 RX ORDER — CEPHALEXIN 500 MG/1
500 CAPSULE ORAL EVERY 6 HOURS
Qty: 28 CAPSULE | Refills: 0 | Status: SHIPPED | OUTPATIENT
Start: 2024-08-19 | End: 2024-08-26

## 2024-08-19 RX ORDER — PREGABALIN 75 MG/1
150 CAPSULE ORAL ONCE
Status: COMPLETED | OUTPATIENT
Start: 2024-08-19 | End: 2024-08-19

## 2024-08-19 RX ORDER — MIDAZOLAM HYDROCHLORIDE 1 MG/ML
INJECTION INTRAMUSCULAR; INTRAVENOUS
Status: DISCONTINUED | OUTPATIENT
Start: 2024-08-19 | End: 2024-08-19

## 2024-08-19 RX ORDER — CEPHALEXIN 500 MG/1
500 CAPSULE ORAL EVERY 6 HOURS
Qty: 4 CAPSULE | Refills: 0 | Status: SHIPPED | OUTPATIENT
Start: 2024-08-19 | End: 2024-08-19

## 2024-08-19 RX ORDER — ACETAMINOPHEN 325 MG/1
325 TABLET ORAL EVERY 4 HOURS
Qty: 84 TABLET | Refills: 0 | Status: SHIPPED | OUTPATIENT
Start: 2024-08-19 | End: 2024-09-02

## 2024-08-19 RX ORDER — BISACODYL 10 MG/1
10 SUPPOSITORY RECTAL 2 TIMES DAILY PRN
Status: DISCONTINUED | OUTPATIENT
Start: 2024-08-19 | End: 2024-08-19 | Stop reason: HOSPADM

## 2024-08-19 RX ORDER — DEXAMETHASONE SODIUM PHOSPHATE 4 MG/ML
INJECTION, SOLUTION INTRA-ARTICULAR; INTRALESIONAL; INTRAMUSCULAR; INTRAVENOUS; SOFT TISSUE
Status: DISCONTINUED | OUTPATIENT
Start: 2024-08-19 | End: 2024-08-19

## 2024-08-19 RX ORDER — CELECOXIB 100 MG/1
200 CAPSULE ORAL ONCE
Status: COMPLETED | OUTPATIENT
Start: 2024-08-19 | End: 2024-08-19

## 2024-08-19 RX ADMIN — PHENYLEPHRINE HYDROCHLORIDE 100 MCG: 10 INJECTION INTRAVENOUS at 11:08

## 2024-08-19 RX ADMIN — PHENYLEPHRINE HYDROCHLORIDE 200 MCG: 10 INJECTION INTRAVENOUS at 10:08

## 2024-08-19 RX ADMIN — BUPIVACAINE 10 ML: 13.3 INJECTION, SUSPENSION, LIPOSOMAL INFILTRATION at 12:08

## 2024-08-19 RX ADMIN — PHENYLEPHRINE HYDROCHLORIDE 100 MCG: 10 INJECTION INTRAVENOUS at 10:08

## 2024-08-19 RX ADMIN — CELECOXIB 400 MG: 100 CAPSULE ORAL at 08:08

## 2024-08-19 RX ADMIN — SODIUM CHLORIDE, SODIUM LACTATE, POTASSIUM CHLORIDE, AND CALCIUM CHLORIDE: .6; .31; .03; .02 INJECTION, SOLUTION INTRAVENOUS at 09:08

## 2024-08-19 RX ADMIN — PHENYLEPHRINE HYDROCHLORIDE 0.2 MCG/KG/MIN: 10 INJECTION INTRAVENOUS at 10:08

## 2024-08-19 RX ADMIN — BUPIVACAINE HYDROCHLORIDE 10 ML: 2.5 INJECTION, SOLUTION EPIDURAL; INFILTRATION; INTRACAUDAL; PERINEURAL at 12:08

## 2024-08-19 RX ADMIN — LIDOCAINE HYDROCHLORIDE 100 MG: 20 INJECTION, SOLUTION INTRAVENOUS at 09:08

## 2024-08-19 RX ADMIN — TRANEXAMIC ACID 1950 MG: 650 TABLET ORAL at 08:08

## 2024-08-19 RX ADMIN — PHENYLEPHRINE HYDROCHLORIDE 150 MCG: 10 INJECTION INTRAVENOUS at 10:08

## 2024-08-19 RX ADMIN — CELECOXIB 200 MG: 100 CAPSULE ORAL at 04:08

## 2024-08-19 RX ADMIN — PROPOFOL 75 MCG/KG/MIN: 10 INJECTION, EMULSION INTRAVENOUS at 09:08

## 2024-08-19 RX ADMIN — PREGABALIN 150 MG: 75 CAPSULE ORAL at 08:08

## 2024-08-19 RX ADMIN — TRANEXAMIC ACID 1000 MG: 1 INJECTION, SOLUTION INTRAVENOUS at 12:08

## 2024-08-19 RX ADMIN — ONDANSETRON 4 MG: 2 INJECTION, SOLUTION INTRAMUSCULAR; INTRAVENOUS at 09:08

## 2024-08-19 RX ADMIN — PROPOFOL 70 MG: 10 INJECTION, EMULSION INTRAVENOUS at 09:08

## 2024-08-19 RX ADMIN — TRANEXAMIC ACID 1000 MG: 100 INJECTION, SOLUTION INTRAVENOUS at 10:08

## 2024-08-19 RX ADMIN — ACETAMINOPHEN 1000 MG: 10 INJECTION, SOLUTION INTRAVENOUS at 09:08

## 2024-08-19 RX ADMIN — MIDAZOLAM HYDROCHLORIDE 2 MG: 1 INJECTION, SOLUTION INTRAMUSCULAR; INTRAVENOUS at 09:08

## 2024-08-19 RX ADMIN — PHENYLEPHRINE HYDROCHLORIDE 50 MCG: 10 INJECTION INTRAVENOUS at 09:08

## 2024-08-19 RX ADMIN — DEXAMETHASONE SODIUM PHOSPHATE 10 MG: 4 INJECTION, SOLUTION INTRA-ARTICULAR; INTRALESIONAL; INTRAMUSCULAR; INTRAVENOUS; SOFT TISSUE at 09:08

## 2024-08-19 RX ADMIN — PREGABALIN 75 MG: 75 CAPSULE ORAL at 04:08

## 2024-08-19 RX ADMIN — PROPOFOL 40 MG: 10 INJECTION, EMULSION INTRAVENOUS at 11:08

## 2024-08-19 RX ADMIN — CEFAZOLIN SODIUM 2 G: 2 SOLUTION INTRAVENOUS at 09:08

## 2024-08-19 RX ADMIN — GLYCOPYRROLATE 0.2 MG: 0.2 INJECTION, SOLUTION INTRAMUSCULAR; INTRAVITREAL at 10:08

## 2024-08-19 NOTE — PLAN OF CARE
Problem: Physical Therapy  Goal: Physical Therapy Goal  Description: Goals to be met by: 24     Patient will increase functional independence with mobility by performin. Sit to stand transfer with Modified [place] with use of RW.   2. Bed to chair transfer with Modified [place] using Rolling Walker  3. Gait  x 150 feet with Modified [place] using Rolling Walker.   4. Ascend/descend 3 stair with right Handrails Modified [place].    Outcome: Progressing     PT/OT co-evaluation performed due to pt's first time OOB post surgery. Pt's PLOF: independent with no AD. Pt at this time requires SBA with bed mobility and MIN A/CGA with use of RW with standing mobility/stair navigation. PT recommending low intensity therapy and increased family assistance (per wife family/neighbor can help as needed). Therapy will continue to progress pt as able.

## 2024-08-19 NOTE — ANESTHESIA PROCEDURE NOTES
Peripheral Block    Patient location during procedure: pre-op   Block not for primary anesthetic.  Reason for block: at surgeon's request and post-op pain management   Post-op Pain Location: Left Knee Pain   Start time: 8/19/2024 12:10 PM  Timeout: 8/19/2024 12:09 PM   End time: 8/19/2024 12:11 PM    Staffing  Authorizing Provider: Marko Edgar MD  Performing Provider: Marko Edgar MD    Staffing  Performed by: Marko Edgar MD  Authorized by: Marko Edgar MD    Preanesthetic Checklist  Completed: patient identified, IV checked, site marked, risks and benefits discussed, surgical consent, monitors and equipment checked, pre-op evaluation and timeout performed  Peripheral Block  Patient position: supine  Prep: ChloraPrep  Patient monitoring: heart rate, cardiac monitor, continuous pulse ox, continuous capnometry and frequent blood pressure checks  Block type: adductor canal  Laterality: left  Injection technique: single shot  Needle  Needle type: Stimuplex   Needle gauge: 21 G  Needle length: 4 in  Needle localization: anatomical landmarks and ultrasound guidance   -ultrasound image captured on disc.  Assessment  Injection assessment: negative aspiration, negative parasthesia and local visualized surrounding nerve  Paresthesia pain: none  Heart rate change: no  Slow fractionated injection: yes  Pain Tolerance: comfortable throughout block      Additional Notes  VSS.  [facility] RN monitoring vitals throughout procedure.  Patient tolerated procedure well.

## 2024-08-19 NOTE — PLAN OF CARE
Patient OOB and dressed, vss, pain controlled, in and out cath done, instructions given, discharged safely in WC

## 2024-08-19 NOTE — BRIEF OP NOTE
Orthopedic Surgery Brief Op Discharge    Procedure:left total knee arthroplasty    Pre-op diagnosis:left knee osteoarthritis    Postop diagnosis: same    Surgeon: MD Quinton Luna MD Lauren Saunee, MD    Blood loss: <50cc    Fluids: see anesthesia documentation    Anesthesia: Spinal anesthesia    Complications: none    Hospital Course  Patient presented to [facility] on day of scheduled surgery and underwent left total knee arthroplasty, please see operative report for further detail. Patient did well postoperatively and was found to be fit for discharge on POD# 0.  The patient mobilized with physical therapy.  They were taught how to use DME appropriately.  Their pain was controlled.  The patient met all discharge criteria.  The patient was discharged home in stable condition. Patient was given paper prescriptions.  They were given a follow-up appointment in 2 weeks in clinic for a wound check and range of motion check.  All questions and concerns of the patient were answered to their satisfaction.    Condition: Patient tolerated procedure well, was extubated, and returned to the recovery unit in stable condition.     Post Op Total Knee Arthroplasty Instructions   1. Enteric coated aspirin 81 mg by mouth twice a day for 6 weeks to prevent blood clots,  unless otherwise indicated.  2. Please take a stomach reflux medication such as pepcid, prevacid, nexium (H-2 blocker or PPI) while on aspirin to prevent stomach ulcers. You will be given a prescription for pepcid.  3. Curt stockings should be worn as much as possible for 6 weeks to prevent blood clots.  4. Do not start antibiotics for any suspected infections related to the surgery until evaluated by dr josé staff  5. No driving for approximately 2-4 weeks  6. You can shower once the incision is completely dry, otherwise place a new dry dressing twice a day if there is drainage. Please call the office if the drainage increases after discharge.  7.  You may resume all pre-surgery medications unless otherwise indicated  8. All patients should be seen in Dr Bliss office approx 2 weeks after surgery  9. Dr Wiley prefers outpatient physical therapy upon discharge home. If home PT is needed, please contact Dr Wiley for approval  10. Patients should see their primary care doctor after discharge home      Please call our team with questions or concerns    Sloane Zabala MD PGY-3  [employer] Orthopedic Surgery

## 2024-08-19 NOTE — PT/OT/SLP EVAL
Physical Therapy Evaluation and Treatment    Patient Name:  Edmar Benitez   MRN:  [id number]    Recommendations:     Discharge Recommendations: Low Intensity Therapy (OP PT)   Discharge Equipment Recommendations: walker, rolling (pt has borrowed RW (but could use personal one for proper height- [facility]))   Barriers to discharge: Needs to urinate postop    Assessment:     Edmar Benitez is a 64 y.o. male admitted with a medical diagnosis of The primary encounter diagnosis was Primary osteoarthritis of left knee. A diagnosis of Arthritis of knee was also pertinent to this visit. He presents with the following impairments/functional limitations: weakness, impaired endurance, impaired self care skills, impaired functional mobility, gait instability, impaired balance, decreased lower extremity function, orthopedic precautions.    PT/OT co-evaluation performed due to pt's first time OOB post surgery. Pt's PLOF: independent with no AD. Pt at this time requires SBA with bed mobility and MIN A/CGA with use of RW with standing mobility/stair navigation. PT recommending low intensity therapy and increased family assistance (per wife family/neighbor can help as needed). Therapy will continue to progress pt as able.     Rehab Prognosis: Good; patient would benefit from acute skilled PT services to address these deficits and reach maximum level of function.    Recent Surgery: Procedure(s) (LRB):  ARTHROPLASTY, KNEE, TOTAL monoblock (Left) Day of Surgery    Plan:     During this hospitalization, patient to be seen BID to address the identified rehab impairments via gait training, therapeutic activities, therapeutic exercises, neuromuscular re-education and progress toward the following goals:    Plan of Care Expires:  09/19/24    Subjective     Chief Complaint: no complaints  Patient/Family Comments/goals: to return home to [facility]  Pain/Comfort:  Pain Rating 1: 0/10  Pain Rating Post-Intervention 1:  0/10    Patients cultural, spiritual, Adventism conflicts given the current situation: no    Living Environment:  Lives with wife in 2 story home with 3 steps to enter with RHR (has full bed and bath downstairs- tub/shower with SC)  Prior to admission, patients level of function was Independent.  Equipment used at home: shower chair.  DME owned (not currently used): none.  Upon discharge, patient will have assistance from spouse/family/neighbor.    Objective:     Communicated with Nurse prior to session.  Patient found HOB elevated with    upon PT entry to room.    General Precautions: Standard, fall  Orthopedic Precautions:LLE weight bearing as tolerated   Braces: N/A  Respiratory Status: Room air    Exams:  Cognitive Exam:  Patient is oriented to Person, Place, Time, and Situation  Sensation:    -       Intact  light/touch to BLE  RLE ROM: WFL  RLE Strength: WFL  LLE ROM: WFL  LLE Strength: WFL    Functional Mobility:  Bed Mobility:     Supine to Sit: stand by assistance  Sit to Supine: stand by assistance  Transfers:     Sit to Stand:  CGA/SBA with rolling walker  Toilet Transfer: see OT note  Gait: ~55ft with use of RW and CGA/SBA; minor LOB due to pt's increased speed; verbal cues to slow down and improve BLE stepping pattern  Stairs: ~4in step with use of RW and MIN A to ascend first time and CGA to descend; CGA to ascend and descend on 2nd trial.       AM-PAC 6 CLICK MOBILITY  Total Score:19       Treatment & Education:  Pt educated on role of therapy.   Pt educated on proper positioning of LLE at rest to prevent knee flexion contracture.   Pt educated on use/positioning/duration of ice.  Pt educated on hand positioning and sequencing with use of RW as well as BLE stepping pattern with ambulation/stair navigation; pt requires verbal cues to slow down and improve sequencing/stability.   Pt educated to use RW at all times with mobility.   Pt and wife educated on having assistance navigating stairs- laterally  holding onto to RHR with BUE; use of gait belt at this time to increase stability and additional person to navigate walker up stairs. Per wife male neighbor/friend will be able to assist with entry/exit into home.   Pt and wife understanding of education provided.   Nursing notified pt was not able to urinate during session.      Patient left HOB elevated with all lines intact, call button in reach, Nurse notified, and spouse/friend present.    GOALS:   Multidisciplinary Problems       Physical Therapy Goals          Problem: Physical Therapy    Goal Priority Disciplines Outcome Goal Variances Interventions   Physical Therapy Goal     PT, PT/OT Progressing     Description: Goals to be met by: 24     Patient will increase functional independence with mobility by performin. Sit to stand transfer with Modified [place] with use of RW.   2. Bed to chair transfer with Modified [place] using Rolling Walker  3. Gait  x 150 feet with Modified [place] using Rolling Walker.   4. Ascend/descend 3 stair with right Handrails Modified [place].                         History:     History reviewed. No pertinent past medical history.    History reviewed. No pertinent surgical history.    Time Tracking:     PT Received On: 24  PT Start Time: 1446     PT Stop Time: 1524  PT Total Time (min): 38 min With OT    Billable Minutes: Evaluation 10, Gait Training 10, and Therapeutic Activity 10      2024

## 2024-08-19 NOTE — PLAN OF CARE
OT noe completed, coeval overlap with PT for safety for first attempt out of bed s/p L TKA post op day 0. Patient with PLOF of independent for ADL, mobility, and shared IADL responsibilities. On evaluation patient requiring minimal / CGA for LB ADLs and CGA / SBA for functional mobility with rolling walker with need for correction due to mild imbalance. Spouse/friend present in room and reciprocate verbal education, and express patient will have assist to ascend steps into home. (Pending ability to urinate), patient is functionally safe to d/c home with family support, low intensity therapy to follow up.    Problem: Occupational Therapy  Goal: Occupational Therapy Goal  Description: Goals to be met by: 9/09/2024     Patient will increase functional independence with ADLs by performing:    LE Dressing with Modified [place].  Toileting from toilet with Modified [place] for hygiene and clothing management.   Toilet transfer to toilet with Supervision with rolling walker.  Functional mobility x3 minutes with rolling walker and SBA.  100% reciprocation of positioning/icing/elevation regimen and task / activity modifications s/p total L knee replacement to maximize ease of reintegration to home environment           Outcome: Progressing

## 2024-08-19 NOTE — OP NOTE
Procedure Note Andrae Monoblock TKA:      DATE OF PROCEDURE:  8/19/2024    PREOPERATIVE DIAGNOSIS: left knee bone-on-bone osteoarthritis.     POSTOPERATIVE DIAGNOSIS: same    PROCEDURE: Image less Computer-assisted  left total knee arthroplasty with unresurfaced patella.     ATTENDING SURGEON: Obed Wiley M.D.   ASSISTANT:  and lacey and med student    Risk Adjustment: Please see media tab for any additional diagnoses faxed from my office related to theTKA.    COMPLICATIONS: None.     IMPLANTS:   1. Andrae NexGen trabecular metal monoblock tibial tray size 7 and 10 mm height .   2. Andrae Persona femoral component wide Size 11  left.     INDICATIONS FOR PROCEDURE: This is a 64 y.o. male with longstanding knee pain. They have failed nonoperative management including injections. Risks and benefits of total knee arthroplasty were explained to the patient. The patient agreed to move forward with total knee arthroplasty. We also discussed the fact that no resurfacing the patella could lead to anterior knee pain requiring additional surgery in the future.     FINDINGS: The patient had a complete articular cartilage loss down to subchondral bone throughout the knee.      PROCEDURE IN DETAIL: The patient was then brought to the Operating Room and spinal anesthesia started without any difficulties. The patient was placed supine on the operative table. .  Knee was then prepped and draped in sterile fashion. Prior to incision, proper site and procedure as well as antibiotic administration was verified. AFCN and ISN nerves were then injected with marcaine. Anterior midline incision was created overlying center patella proximally to the medial border of the tibial tubercle distally. Skin, subcutaneous fat and deep fascial layer were sharply incised until we came to extensor mechanism retinaculum. Flap was then elevated medially to VMO and laterally to lateral border of patella. Knee was then aspirated and synovial  fluid sent for cell count. Medial parapatellar arthrotomy was injected with Marcaine and a medial parapatellar arthrotomy was then created. Synovium overlying the anterolateral distal femur was then excised as well as the infrapatellar tendon fat pad. Sleeve of soft tissue was elevated off the proximal medial tibia. Periphery of the patella was denvervated using bovie cautery, Hohmann retractors then placed medially and laterally along the distal femur to protect the collateral ligaments. ACL and anterior horn of lateral meniscus were then transected. We then pinned our navigation tracker on to distal femur and then performed our anatomy survey for the femur. We placed distal femoral cutting guide such that we were perpendicular to mechanical axis, aiming for about 1 degree of flexion and about 9 mm of bony resection. Distal femur was then resected. Next, we placed AP sizing guide on distal femur and measured for a size 11 femoral component. We then drilled 2 pin holes in 3 degrees of external rotation relative to posterior condylar axis. Anteroposterior condylar bone was then resected. Resected posterior femoral bone measured approximately 3 mm in difference with the lateral piece thinner than the medial piece. Next, we subluxed the tibia forward and resected medial and lateral menisci. We then pinned our navigation tracker on to the proximal tibia and then performed our anatomy survey for tibia. We placed our proximal tibial cutting guide such that we were perpendicular to the mechanical axis, aiming for about 1 to 2 mm of bony resection medially  and about  6 degrees posterior slope. Proximal tibial bone was then resected and detached from posterior soft tissues using Bovie cautery. Next, with knee at 90 degrees, we placed a laminar  in lateral compartment and resected the ACL as well as small osteophytes posteriorly along the femur. We then injected the posterior capsule with marcaine. We then assessed  our gaps using a 10 mm spacer block. With the 10 mm spacer block, the knee came out to full extension with nice stability with varus and valgus stress, and nice symmetry between flexion and extension. We assessed our tibial cut and our alignment esme fell within the center of the ankle. Once we were happy with soft tissue sleeves and bony cuts, we then placed tibial protector on the proximal tibia and we then created our chamfer cuts. We then placed our femoral trial. This was lateralized as much as possible and anchored using the lug hole screws.  Next, we subluxed the tibia forward, and sized our proximal tibia for a size 7 baseplate, the center of which was rotated such that it was in line with medial third tibial tubercle. This was then pinned in place. We then trialed using a 10 trial polyethylene. With 10 mm polyethylene, the knee came out to full extension. We had nice stability with varus and valgus stress and nice symmetry between flexion and extension. Patella tracked centrally within trochlear groove. Once we were happy with all our trial components, we then removed all our trial components, except the tibial baseplate. We then drilled our trabecular metal peg holes. We then drilled the sclerotic bone along the tibia using a long drill bit as well. We then placed the knee in extension and examined the posterior aspect of knee for bleeding. We then prepared our bony surfaces for implantation using pulse lavage antibiotic saline. Femoral component was then impacted into palce. We then protected the femoral component with a lap pad and subluxed the tibia forward. we then subluxed the tibia forward and impacted the monoblock tibial component into place. We then reexamined our gaps, stability and tracking; all of which remained unchanged. We then copiously irrigated the knee with pulse lavage betadine saline. We then closed our medial parapatellar arthrotomy with a running #2 barbed suture,  subcutaneous deep  fascial layer was closed with simple interrupted #1 vicryl suture, subcutaneous skin with 2-0 Vicryl and incision with silver water proof dressing. The patient was then transferred to Recovery Room bed in stable condition.

## 2024-08-19 NOTE — ANESTHESIA POSTPROCEDURE EVALUATION
Anesthesia Post Evaluation    Patient: Edmar Benitez    Procedure(s) Performed: Procedure(s) (LRB):  ARTHROPLASTY, KNEE, TOTAL monoblock (Left)    Final Anesthesia Type: general      Patient location during evaluation: PACU  Patient participation: Yes- Able to Participate  Level of consciousness: awake and alert  Post-procedure vital signs: reviewed and stable  Pain management: adequate  Airway patency: patent    PONV status at discharge: No PONV  Anesthetic complications: no      Cardiovascular status: blood pressure returned to baseline  Respiratory status: unassisted  Hydration status: euvolemic                Vitals Value Taken Time   /79 08/19/24 1248   Temp 36.7 °C (98 °F) 08/19/24 1250   Pulse 66 08/19/24 1254   Resp 19 08/19/24 1254   SpO2 95 % 08/19/24 1254   Vitals shown include unfiled device data.      Event Time   Out of Recovery 12:55:31         Pain/Hortencia Score: Pain Rating Prior to Med Admin: 7 (8/19/2024  8:13 AM)  Hortencia Score: 9 (8/19/2024 12:45 PM)

## 2024-08-19 NOTE — ANESTHESIA PROCEDURE NOTES
Spinal    Diagnosis: Left Knee Pain  Patient location during procedure: OR  Start time: 8/19/2024 9:36 AM  Timeout: 8/19/2024 9:35 AM  End time: 8/19/2024 9:40 AM    Staffing  Authorizing Provider: Marko Edgar MD  Performing Provider: Marko Edgar MD    Staffing  Performed by: Marko Edgar MD  Authorized by: Marko Edgar MD    Preanesthetic Checklist  Completed: patient identified, IV checked, site marked, risks and benefits discussed, surgical consent, monitors and equipment checked, pre-op evaluation and timeout performed  Spinal Block  Prep: ChloraPrep  Patient monitoring: heart rate and frequent blood pressure checks  Location: L3-4  Injection technique: single shot  CSF Fluid: clear free-flowing CSF  Needle  Needle type: pencil-tip   Needle gauge: 25 G  Needle length: 3.5 in  Needle localization: anatomical landmarks  Assessment  Ease of block: easy  Patient's tolerance of the procedure: comfortable throughout block  Additional Notes  1.6 mL of 0.75% hyperbaric bupivacaine

## 2024-08-19 NOTE — PT/OT/SLP EVAL
Occupational Therapy   Evaluation and treatment    Name: Edmar Benitez  MRN: [id number]  Admitting Diagnosis: <principal problem not specified>  Recent Surgery: Procedure(s) (LRB):  ARTHROPLASTY, KNEE, TOTAL monoblock (Left) Day of Surgery    Recommendations:     Discharge Recommendations: Low Intensity Therapy  Discharge Equipment Recommendations:   (walker, rolling (patient has a borrowed RW (but could use personal one for proper height- [facility])))  Barriers to discharge:  None    Assessment:     Edmar Benitez is a 64 y.o. male with a medical diagnosis of <principal problem not specified>.  He presents with ..The primary encounter diagnosis was Primary osteoarthritis of left knee. A diagnosis of Arthritis of knee was also pertinent to this visit.  . Performance deficits affecting function: weakness, impaired endurance, impaired self care skills, impaired functional mobility, decreased lower extremity function, decreased safety awareness, gait instability, impaired balance, decreased ROM, orthopedic precautions.      OT eval completed, coeval overlap with PT for safety for first attempt out of bed s/p L TKA post op day 0. Patient with PLOF of independent for ADL, mobility, and shared IADL responsibilities. On evaluation patient requiring minimal / CGA for LB ADLs and CGA / SBA for functional mobility with rolling walker with need for correction due to mild imbalance. Spouse/friend present in room and reciprocate verbal education, and express patient will have assist to ascend steps into home. (Pending ability to urinate), patient is functionally safe to d/c home with family support, low intensity therapy to follow up.      Rehab Prognosis: Good; patient would benefit from acute skilled OT services to address these deficits and reach maximum level of function.       Plan:     Patient to be seen 5 x/week to address the above listed problems via self-care/home management, therapeutic  activities, therapeutic exercises  Plan of Care Expires: 09/09/24  Plan of Care Reviewed with: patient, spouse, friend    Subjective     Chief Complaint: no pain, just indicates that left knee feels a little different  Patient/Family Comments/goals: eager and wants to go home    Occupational Profile:  Living Environment: resides with spouse in a 2 story home with 3 steps to enter with R  handrail. Tub shower with shower chair downstairs and access to bedroom. Patient's spouse has planned for patient to temporarily reside downstairs  Previous level of function: independent ADLs, IADLs, mobility, no recent falls, drives; does not work  Equipment Used at Home: shower chair  Assistance upon Discharge: spouse and friend as well as per spouse report patient will have support of neighbor (to help get patient in the house)    Pain/Comfort:  Pain Rating 1: 0/10  Pain Addressed 1: Reposition, Cessation of Activity, Pre-medicate for activity  Pain Rating Post-Intervention 1: 0/10      Objective:     Communicated with: nursing prior to session.  Patient found HOB elevated with  (n/a) upon OT entry to room.    General Precautions: Standard, fall  Orthopedic Precautions: LLE weight bearing as tolerated  Braces: N/A  Respiratory Status: Room air    Occupational Performance:    Bed Mobility:    Patient completed Supine to Sit with stand by assistance  Patient completed Sit to Supine with stand by assistance    Functional Mobility/Transfers:  Patient completed Sit <> Stand Transfer with CGA/SBA  with  rolling walker and verbal cues for hand placement   Patient completed Toilet Transfer Step Transfer technique with contact guard assistance with  rolling walker; education with verbal teachback for placement of walker over commode frame when standing to urinate; instructed on first attempt to perform in seated  Functional Mobility: in room / out of room mobility with CGA to SBA, small LOB episode x2 with recovery 2/2 elevated speed  benefiting from cues to slow down and focus on proper step sequencing.  OT on SBA/second person for safety for PT instructed stair negotiation training    Activities of Daily Living:  Upper Body Dressing: set up  Lower Body Dressing: minimum assistance ; LLE first  Toileting: contact guard assistance ; minimal verbal cues for safety    Cognitive/Visual Perceptual:  Cognitive/Psychosocial Skills:     -       Oriented to: Person, Place, Time, and Situation   -       Follows Commands/attention:follows multi commands, but needs min reminders to slow down  -       Safety awareness/insight to disability: intact and spouse is supportive/ reinforces   -       Mood/Affect/Coping skills/emotional control: Pleasant    Physical Exam:  Skin integrity: Left knee surgical incision dressed/intact  Upper Extremity Range of Motion:     -       Right Upper Extremity: WFL  -       Left Upper Extremity: WFL  Upper Extremity Strength:    -       Right Upper Extremity: WFL  -       Left Upper Extremity: WFL    AMPAC 6 Click ADL:  AMPAC Total Score: 21    Treatment & Education:  Patient, spouse, and patient's friend educated on role of OT/ POC development. Co-evaluation with physical therapy in consideration of first attempt out of bed s/p L TKA .   Patient educated on positioning / elevating surgical LLE with correct placement of pillow to encourage knee extension and prevent knee contracture, and use of ice pack with barrier.   ADL / functional mobility training as above.  Lower body dressing performed by donning surgical extremity first  following therapist demonstration.   Functional mobility performed as above with instructed mobility sequence, with reteaching for slowing pace.    Educated on home safety with showers and/or shower transfer via performance of dry run approach prior to true shower with reciprocation verbally.   Provided with gait safety belt and affirmed recommendation to use for entry into home.  Educated on need to  use rolling walker at all times.  Patient/spouse confirm understanding.      Patient left HOB elevated with all lines intact, call button in reach, and nursing notified  RN informed and aware patient did not urinate yet post sx.  GOALS:   Multidisciplinary Problems       Occupational Therapy Goals          Problem: Occupational Therapy    Goal Priority Disciplines Outcome Interventions   Occupational Therapy Goal     OT, PT/OT Progressing    Description: Goals to be met by: 9/09/2024     Patient will increase functional independence with ADLs by performing:    LE Dressing with Modified [place].  Toileting from toilet with Modified [place] for hygiene and clothing management.   Toilet transfer to toilet with Supervision with rolling walker.  Functional mobility x3 minutes with rolling walker and SBA.  100% reciprocation of positioning/icing/elevation regimen and task / activity modifications s/p total L knee replacement to maximize ease of reintegration to home environment                                History:     History reviewed. No pertinent past medical history.    History reviewed. No pertinent surgical history.    Time Tracking:     OT Date of Treatment: 08/19/24  OT Start Time: 1446  OT Stop Time: 1521  OT Total Time (min): 35 min total time    Billable Minutes:Evaluation 10 min  Self Care/Home Management 10 min  Therapeutic Activity 10 min    8/19/2024

## 2024-08-19 NOTE — TRANSFER OF CARE
"Anesthesia Transfer of Care Note    Patient: Edmar Benitez    Procedure(s) Performed: Procedure(s) (LRB):  ARTHROPLASTY, KNEE, TOTAL monoblock (Left)    Patient location: PACU    Anesthesia Type: general    Transport from OR: Transported from OR on 6-10 L/min O2 by face mask with adequate spontaneous ventilation    Post pain: adequate analgesia    Post assessment: no apparent anesthetic complications and tolerated procedure well    Post vital signs: stable    Level of consciousness: awake    Nausea/Vomiting: no nausea/vomiting    Complications: none    Transfer of care protocol was followed      Last vitals: Visit Vitals  BP (!) 114/88   Pulse 83   Temp 36.7 °C (98.1 °F) (Skin)   Resp 18   Ht 6' 4" (1.93 m)   Wt 105.7 kg (233 lb)   SpO2 95%   BMI 28.36 kg/m²     "

## 2024-08-19 NOTE — DISCHARGE INSTRUCTIONS
Post Op Total Knee Arthroplasty Instructions     1. Enteric coated aspirin 81 mg by mouth twice a day for 6 weeks to prevent blood clots,  unless otherwise indicated.  2. Please take a stomach reflux medication such as pepcid, prevacid, nexium (H-2 blocker or PPI) while on aspirin to prevent stomach ulcers. You will be given a prescription for pepcid.  3. Curt stockings should be worn as much as possible for 6 weeks to prevent blood clots.  4. Do not start antibiotics for any suspected infections related to the surgery until evaluated by dr josé staff  5. No driving for approximately 2-4 weeks  6. You can shower with current intact dressing in place, do not soak in shower and if dressing begins to fall off you need it replaced before showering and getting it wet  7. You may resume all pre-surgery medications unless otherwise indicated  8. All patients should be seen in Dr José office approx 2 weeks after surgery  9. Dr Wiley prefers outpatient physical therapy upon discharge home. If home PT is needed, please contact Dr Wiley for approval  10. Patients should see their primary care doctor after discharge home  ANESTHESIA  -For the first 24 hours after surgery:  Do not drive, use heavy equipment, make important decisions, or drink alcohol  -It is normal to feel sleepy for several hours.  Rest until you are more awake.  -Have someone stay with you, if needed.  They can watch for problems and help keep you safe.  -Some possible post anesthesia side effects include: nausea and vomiting, sore throat and hoarseness, sleepiness, and dizziness.    PAIN  -If you have pain after surgery, pain medicine will help you feel better.  Take it as directed, before pain becomes severe.  Most pain relievers taken by mouth need at least 20-30 minutes to start working.  -Do not drive or drink alcohol while taking pain medicine.  -Pain medication can upset your stomach.  Taking them with a little food may help.  -Other ways to help  control pain: elevation, ice, and relaxation  -Call your surgeon if still having unmanageable pain an hour after taking pain medicine.  -Pain medicine can cause constipation.  Taking an over-the counter stool softener while on prescription pain medicine and drinking plenty of fluids can prevent this side effect.  -Call your surgeon if you have severe side effects like: breathing problems, trouble waking up, dizziness, confusion, or severe constipation.    NAUSEA  -Some people have nausea after surgery.  This is often because of anesthesia, pain, pain medicine, or the stress of surgery.  -Do not push yourself to eat.  Start off with clear liquids and soup.  Slowly move to solid foods.  Don't eat fatty, rich, spicy foods at first.  Eat smaller amounts.  -If you develop persistent nausea and vomiting please notify your surgeon immediately.    BLEEDING  -Different types of surgery require different types of care and dressing changes.  It is important to follow all instructions and advice from your surgeon.  Change dressing as directed.  Call your surgeon for any concerns regarding postop bleeding.    SIGNS OF INFECTION  -Signs of infection include: fever, swelling, drainage, and redness  -Notify your surgeon if you have a fever of 100.4 F (38.0 C) or higher.  -Notify your surgeon if you notice redness, swelling, increased pain, pus, or a foul smell at the incision site.     IF YOU ARE UNABLE TO URINATE IN APPROXIMATELY 8-10 HOURS, YOU MAY NEED TO REPORT TO THE NEAREST EMERGENCY ROOM FOR ANOTHER CATHETER ESPECIALLY IF YOU ARE HAVING BLADDER PAIN

## 2024-08-19 NOTE — PROGRESS NOTES
Protocol: [employer] (Deidre)  Protocol#:Deidre  IRB# 2021.156  Version Date: 10-Adiel-2023  PI: Obed Wiley MD  Patient Initials: HEVER  (Study ID# [id number])         2nd Treatment Visit - TKA Surgery     Patient admitted to hospital-based outpatient department (HOPD) at [facility] for Total Knee Surgery to target knee (left) under the direction of Dr. Wiley.     Patient's pre-op pain score was 7.     Pre-op medications: pregabalin; celecoxib; tranexamic acid  Type(s) of anesthesia used: spinal  Was a tourniquet used? No  Nerve block type: Adductor canal block (bupivacaine)  Local Anaesthetic Field block: Adductor canal block (bupivacaine)  Intra-op medications: Marcaine and Exparel  Post-op medications: pregabalin; celecoxib; tranexamic acid  American Society of Anesthesiologists (ASA) Physical Status: II     Discharged to second stage recovery post surgery for recovery.      Discharged from hospital on 8/19/2024 at 5:43pm to home.     Physical Therapy scheduled to begin on 8/20/2024.      Medications prescribed at time of discharge are:     Medication Name Does (with units) Frequency Number of pills prescribed Refills (if yes, how many)   Acetaminophen (tylenol) 325mg Scheduled  84 no   Aspirin  81mg Scheduled  84 no   cephALEXin (KEFLEX) 500mg Scheduled  28 no   famotidine (PEPCID) 20mg Scheduled  84 no   tranexamic acid (LYSTEDA) 650mg Scheduled  28 no                No adverse events reported.    CPT Code: 76075  ICD Code:  M17.12

## 2024-08-20 ENCOUNTER — CLINICAL SUPPORT (OUTPATIENT)
Dept: REHABILITATION | Facility: HOSPITAL | Age: 64
End: 2024-08-20
Payer: OTHER GOVERNMENT

## 2024-08-20 VITALS
HEIGHT: 76 IN | WEIGHT: 233 LBS | RESPIRATION RATE: 17 BRPM | OXYGEN SATURATION: 96 % | BODY MASS INDEX: 28.37 KG/M2 | HEART RATE: 81 BPM | SYSTOLIC BLOOD PRESSURE: 151 MMHG | TEMPERATURE: 98 F | DIASTOLIC BLOOD PRESSURE: 79 MMHG

## 2024-08-20 DIAGNOSIS — M62.81 QUADRICEPS WEAKNESS: ICD-10-CM

## 2024-08-20 DIAGNOSIS — M17.12 PRIMARY OSTEOARTHRITIS OF LEFT KNEE: ICD-10-CM

## 2024-08-20 DIAGNOSIS — G89.29 CHRONIC PAIN OF LEFT KNEE: ICD-10-CM

## 2024-08-20 DIAGNOSIS — M25.562 CHRONIC PAIN OF LEFT KNEE: ICD-10-CM

## 2024-08-20 DIAGNOSIS — Z74.09 IMPAIRED FUNCTIONAL MOBILITY, BALANCE, GAIT, AND ENDURANCE: Primary | ICD-10-CM

## 2024-08-20 DIAGNOSIS — R29.898 DECREASED STRENGTH INVOLVING KNEE JOINT: ICD-10-CM

## 2024-08-20 DIAGNOSIS — M25.662 DECREASED RANGE OF MOTION OF LEFT KNEE: ICD-10-CM

## 2024-08-20 PROCEDURE — 97162 PT EVAL MOD COMPLEX 30 MIN: CPT | Mod: PN

## 2024-08-20 PROCEDURE — 97140 MANUAL THERAPY 1/> REGIONS: CPT | Mod: PN

## 2024-08-20 PROCEDURE — 97112 NEUROMUSCULAR REEDUCATION: CPT | Mod: PN

## 2024-08-20 NOTE — PLAN OF CARE
[facility]  Physical Therapy Initial Evaluation    Name: Edmar Benitez  Clinic Number: [id number]    Therapy Diagnosis:   Encounter Diagnoses   Name Primary?    Primary osteoarthritis of left knee     Chronic pain of left knee     Quadriceps weakness     Impaired functional mobility, balance, gait, and endurance Yes    Decreased range of motion of left knee     Decreased strength involving knee joint         Physician: Obed Wiley MD    Physician Orders: PT Eval and Treat   Medical Diagnosis from Referral:   M17.12 (ICD-10-CM) - Primary osteoarthritis of left knee   M25.562,G89.29 (ICD-10-CM) - Chronic pain of left knee   M62.81 (ICD-10-CM) - Quadriceps weakness     Evaluation Date: 8/20/2024  Authorization Period Expiration: 12/31/2024  Plan of Care Expiration: 8/20/2024 to 10/11/2024  Visit # / Visits authorized: 1/TBD FOTO: 1/7    Time In: 1000  Time Out: 1050  Total Appointment Time (timed & untimed codes): 45 minutes (1 LCE, 1 MT, 1 NM)  Precautions: hyperglycemia ,HTN     SUBJECTIVE    Edmar is a 63 y/o M. S/p left total knee arthroplasty. Sx date: 08/19/2024. POD#1. Voices increased pain with muscle spasms hip/thigh. Also voices some numbness still along high left anterior leg.     Imaging:   Pre-op left knee imaging.    Prior Therapy:  None  Social History:  Lives at home with wife.    Occupation:   Retired. Industrial and .   Prior Level of Function:  Occasionally used a cane for ambulation assistance prior surgery.  [place]      Also has a motorcycle he likes to ride.   Current Level of Function:  Acute post-op state. Ambulating with rolling walker.     Pain:  Current 8/10, worst 10/10, best 5/10   Location: left knee   Description: Tight, Deep, and Sharp  Aggravating Factors: walking, bending, sit-to-stand  Easing Factors: medications    Patients goals:  1. To return to riding his bicycle.      Medical History:    No past medical history on file. Hypertension, hyperglycemia,     Surgical History:   Edmar Benitez  has a past surgical history that includes Total knee arthroplasty (Left, 8/19/2024).    Medications:   Edmar has a current medication list which includes the following prescription(s): acetaminophen, amlodipine, aspirin, lumify, bupropion, calcium citrate-vitamin d3 315-200 mg, cephalexin, diclofenac, diclofenac sodium, empagliflozin, ergocalciferol, ezetimibe, famotidine, fluticasone propionate, lactobacillus acidophilus, loratadine, meloxicam, metformin, tamsulosin, tranexamic acid, and trazodone.    Allergies:   Review of patient's allergies indicates:   Allergen Reactions    Milk     Acetaminophen Nausea And Vomiting          OBJECTIVE     Posture:  Left knee being held consistently in flexion.   Palpation:  Large post-op effusion. No excessive drainage surgical wound.     Range of motion:  Left knee: (-) 20 - 75  Right knee: 0-135    Strength:   Quad set: poor   Straight leg raise: unable without assistance    TUG:  >30 seconds (w/ RW)  30 second sit-to-stand test (without U/E support): 0   Gait Analysis: with RW: heavy antalgic pattern; step-through; knee flexion throughout    Limitation/Restriction for FOTO Knee Survey    Therapist reviewed FOTO scores for Edmar Benitez on 8/20/2024.   FOTO documents entered into EPIC - see Media section.    Limitation Score: 94%  Predicted Limitation Score: 63%    KOOS, Jr: 8.3           TREATMENT     Total Treatment time (time-based codes) separate from Evaluation: 30 minutes      Edmar received the treatments listed below:      Manual therapy techniques: total time 15 minutes, including:  Patellar mobs medial/lateral, superior/inferior grade II/III  Passive physiological knee flexion/extension grade II/III    Neuromuscular re-education activities to improve: Sense, Proprioception, and Posture for 15 minutes. The following activities were  included:  Short-sitting heel propped quad sets  Short-sitting heel propped ankle pumps 2x10   Short-sitting heel propped SAQs strap assist 2x10    Cold pack for 5' minutes for pain and inflammation.      PATIENT EDUCATION AND HOME EXERCISES     Education provided:   - post-op total knee arthroplasty program including expectations, prognosis  - home exercise program: see above exercises    Written Home Exercises Provided: Patient instructed to cont prior HEP. Exercises were reviewed and Edmar was able to demonstrate them prior to the end of the session.  Edmar demonstrated good  understanding of the education provided. See EMR under Patient Instructions for exercises provided during therapy sessions.    ASSESSMENT     Edmar is a 64 y.o. male referred to outpatient Physical Therapy with a medical diagnosis of M17.12 (ICD-10-CM) - Primary osteoarthritis of left knee, M25.562,G89.29 (ICD-10-CM) - Chronic pain of left knee, M62.81 (ICD-10-CM) - Quadriceps weakness. S/p left total knee arthroplasty. Sx date: 08/19/2024. POD#1. Examination today consistent with acute post-op state. Heavy left knee flexion today.  Pain poorly controlled at this time. Focus of session on pain modulation and light range of motion.      Patient prognosis is Excellent.   Patient will benefit from skilled outpatient Physical Therapy to address the deficits stated above and in the chart below, provide patient /family education, and to maximize patientt's level of independence.     Plan of care discussed with patient: Yes  Patient's spiritual, cultural and educational needs considered and patient is agreeable to the plan of care and goals as stated below:     Anticipated Barriers for therapy: none    Medical Necessity is demonstrated by the following  History  Co-morbidities and personal factors that may impact the plan of care [] LOW: no personal factors / co-morbidities  [x] MODERATE: 1-2 personal factors / co-morbidities  [] HIGH:  "3+ personal factors / co-morbidities    Moderate / High Support Documentation:   Co-morbidities affecting plan of care: hypertension, hyperglycemia, chronic neck pain.     Personal Factors:   age     Examination  Body Structures and Functions, activity limitations and participation restrictions that may impact the plan of care [] LOW: addressing 1-2 elements  [x] MODERATE: 3+ elements  [] HIGH: 4+ elements (please support below)    Moderate / High Support Documentation: range of motion, strength, gait, balance, propriocpetion.      Clinical Presentation [] LOW: stable  [x] MODERATE: Evolving  [] HIGH: Unstable     Decision Making/ Complexity Score: moderate       Goals:  Short Term Goals: 3 weeks  1. Pt will be independent with HEP supplement PT in improving functional mobility.  2. Pt will improve LLE strength to at least 60% of RLE strength as measured via MicroFet handheld dynamometer in order to improve functional mobility  3. Pt will improve L knee AROM to at least (-) 5 - 100 in order to improve gait.    Long Term Goals: 6-8 weeks  1. Pt will be independent with updated HEP supplement PT in improving functional mobility.  2. Pt will improve L LE strength to at least 70% of RLE strength as measured via MicroFet handheld dynamometer in order to improve functional mobility  3. Pt will improve L knee AROM to at least 0-120 in order to improve gait and ability to perform ADLs  4. Pt will improve FOTO knee survey score to </= 60% limited in order to demo improved functional mobility  5. Pt will improve KOOS Score by 9 points.   6. Pt will perform TUG in < 14 seconds without AD in order to demo improved gait speed  7. Pt will perform at least 5 sit to stands without UE support on 30 second sit to stand test in order to demo improved ability to perform transfers    TUG Cutoff Scores:        30" sit to stand Cutoff Scores:          Plan     Plan of care Certification: 8/20/2024 to 10/11/2027.    Outpatient Physical " Therapy 4 times weekly for 6 weeks to include the following interventions: Gait Training, Manual Therapy, Moist Heat/ Ice, Neuromuscular Re-ed, Orthotic Management and Training, Patient Education, Therapeutic Activites and Therapeutic Exercise, IASTM.    Bret Gu, PT, DPT, OCS

## 2024-08-21 ENCOUNTER — CLINICAL SUPPORT (OUTPATIENT)
Dept: REHABILITATION | Facility: HOSPITAL | Age: 64
End: 2024-08-21
Payer: OTHER GOVERNMENT

## 2024-08-21 ENCOUNTER — TELEPHONE (OUTPATIENT)
Dept: ORTHOPEDICS | Facility: CLINIC | Age: 64
End: 2024-08-21
Payer: OTHER GOVERNMENT

## 2024-08-21 ENCOUNTER — TELEPHONE (OUTPATIENT)
Dept: RESEARCH | Facility: HOSPITAL | Age: 64
End: 2024-08-21
Payer: OTHER GOVERNMENT

## 2024-08-21 DIAGNOSIS — Z74.09 IMPAIRED FUNCTIONAL MOBILITY, BALANCE, GAIT, AND ENDURANCE: Primary | ICD-10-CM

## 2024-08-21 DIAGNOSIS — M25.662 DECREASED RANGE OF MOTION OF LEFT KNEE: ICD-10-CM

## 2024-08-21 DIAGNOSIS — R29.898 DECREASED STRENGTH INVOLVING KNEE JOINT: ICD-10-CM

## 2024-08-21 PROCEDURE — 97140 MANUAL THERAPY 1/> REGIONS: CPT | Mod: PN

## 2024-08-21 PROCEDURE — 97110 THERAPEUTIC EXERCISES: CPT | Mod: PN

## 2024-08-21 PROCEDURE — 97112 NEUROMUSCULAR REEDUCATION: CPT | Mod: PN

## 2024-08-21 RX ORDER — GABAPENTIN 300 MG/1
600 CAPSULE ORAL 2 TIMES DAILY
Qty: 56 CAPSULE | Refills: 0 | Status: SHIPPED | OUTPATIENT
Start: 2024-08-21 | End: 2024-09-04

## 2024-08-21 RX ORDER — CYCLOBENZAPRINE HCL 5 MG
5 TABLET ORAL NIGHTLY
Qty: 14 TABLET | Refills: 0 | Status: SHIPPED | OUTPATIENT
Start: 2024-08-21 | End: 2024-09-04

## 2024-08-21 NOTE — PROGRESS NOTES
"[facility]   Physical Therapy Treatment Note      Name: Edmar Benitez  Clinic Number: [id number]    Therapy Diagnosis:   Encounter Diagnoses   Name Primary?    Impaired functional mobility, balance, gait, and endurance Yes    Decreased range of motion of left knee     Decreased strength involving knee joint      Physician: Obed Wiley MD    Visit Date: 8/21/2024    Physician Orders: PT Eval and Treat   Medical Diagnosis from Referral:   M17.12 (ICD-10-CM) - Primary osteoarthritis of left knee   M25.562,G89.29 (ICD-10-CM) - Chronic pain of left knee   M62.81 (ICD-10-CM) - Quadriceps weakness      Evaluation Date: 8/20/2024  Authorization Period Expiration: 12/31/2024  Plan of Care Expiration: 8/20/2024 to 10/11/2024  Visit # / Visits authorized: 1/14 (+eval)  FOTO: 2/7  PTA visits: 0/5     Time In: 1000  Time Out: 1055  Total Appointment Time (timed & untimed codes): 55 minutes (2 NM, 1 TE, 1 MT)  Precautions: hyperglycemia ,HTN     Subjective     Patient reports: feeling better after getting his post-op prescriptions filled.   He was compliant with home exercise program.  Response to previous treatment: eval and HE  Functional change: improved sitting tolerance    Pain: 5/10  Location: left knee     Objective      Quad set: Fair (improved with posterior tactile feedback)  Straight leg raise: unable unassisted  Range of motion: (-) 8 - 80  Wound: 1 area upper medial scar with juan miguel blood.     Treatment     Edmar received the treatments listed below:      Therapeutic exercises to develop strength, endurance, and ROM for 10 minutes including:  Seated heel slides 2x10   Short sitting heel propped calf stretch strap 5x30"     Manual therapy techniques: total time 15 minutes, including:  Patellar mobs medial/lateral, superior/inferior grade II/III  Passive physiological knee flexion/extension grade II/III     Neuromuscular re-education activities to improve: Sense, Proprioception, and " Posture for 30 minutes. The following activities were included:  Short-sitting heel propped quad sets 2x10  Short-sitting heel propped ankle pumps 2x20  Short-sitting heel propped Slr strap assist 2x10  Short sitting EOM 90-45 LAQs 3x5     Cold pack at home      Patient Education and Home Exercises       Education provided:   - home exercise program: continue     Written Home Exercises Provided: Pt instructed to continue prior HEP. Exercises were reviewed and Edmar was able to demonstrate them prior to the end of the session.  Edmar demonstrated good  understanding of the education provided. See Electronic Medical Record under Patient Instructions for exercises provided during therapy sessions    Assessment   A: Edmar is a 64 y.o. male.  S/p left total knee arthroplasty. Sx date: 08/19/2024. POD#2. Examination today consistent with acute post-op state. Heavy left knee flexion today.  Pain much better controlled today. Focus of session on pain modulation and light range of motion and quad activation.        Edmar Is progressing well towards his goals.   Patient prognosis is Excellent.     Patient will continue to benefit from skilled outpatient physical therapy to address the deficits listed in the problem list box on initial evaluation, provide pt/family education and to maximize pt's level of independence in the home and community environment.     Patient's spiritual, cultural and educational needs considered and pt agreeable to plan of care and goals.     Anticipated barriers to physical therapy: none    Goals:   Short Term Goals: 3 weeks --> progressing towards  1. Pt will be independent with HEP supplement PT in improving functional mobility.  2. Pt will improve LLE strength to at least 60% of RLE strength as measured via MicroFet handheld dynamometer in order to improve functional mobility.  3. Pt will improve L knee AROM to at least (-) 5 - 100 in order to improve gait.     Long Term Goals: 6-8  weeks --> progressing towards  1. Pt will be independent with updated HEP supplement PT in improving functional mobility.  2. Pt will improve L LE strength to at least 70% of RLE strength as measured via MicroFet handheld dynamometer in order to improve functional mobility  3. Pt will improve L knee AROM to at least 0-120 in order to improve gait and ability to perform ADLs  4. Pt will improve FOTO knee survey score to </= 60% limited in order to demo improved functional mobility  5. Pt will improve KOOS Score by 9 points.   6. Pt will perform TUG in < 14 seconds without AD in order to demo improved gait speed  7. Pt will perform at least 5 sit to stands without UE support on 30 second sit to stand test in order to demo improved ability to perform transfers    Plan   Phase 1 post-op total knee arthroplasty.   Pain modulation  Swelling management  Progress range of motion     Bret Gu, PT, DPT, OCS

## 2024-08-21 NOTE — TELEPHONE ENCOUNTER
Deidre Study  Study ID#[id number]    Patient was called and reminded that his daily questionnaires of the Deidre study are open.

## 2024-08-23 ENCOUNTER — CLINICAL SUPPORT (OUTPATIENT)
Dept: REHABILITATION | Facility: HOSPITAL | Age: 64
End: 2024-08-23
Payer: OTHER GOVERNMENT

## 2024-08-23 DIAGNOSIS — R29.898 DECREASED STRENGTH INVOLVING KNEE JOINT: ICD-10-CM

## 2024-08-23 DIAGNOSIS — M25.662 DECREASED RANGE OF MOTION OF LEFT KNEE: ICD-10-CM

## 2024-08-23 DIAGNOSIS — Z74.09 IMPAIRED FUNCTIONAL MOBILITY, BALANCE, GAIT, AND ENDURANCE: Primary | ICD-10-CM

## 2024-08-23 PROCEDURE — 97140 MANUAL THERAPY 1/> REGIONS: CPT | Mod: PN

## 2024-08-23 PROCEDURE — 97112 NEUROMUSCULAR REEDUCATION: CPT | Mod: PN

## 2024-08-23 PROCEDURE — 97014 ELECTRIC STIMULATION THERAPY: CPT | Mod: PN

## 2024-08-23 PROCEDURE — 97110 THERAPEUTIC EXERCISES: CPT | Mod: PN

## 2024-08-23 NOTE — PROGRESS NOTES
"[facility]   Physical Therapy Treatment Note      Name: Edmar Benitez  Clinic Number: [id number]    Therapy Diagnosis:   Encounter Diagnoses   Name Primary?    Impaired functional mobility, balance, gait, and endurance Yes    Decreased range of motion of left knee     Decreased strength involving knee joint        Physician: Obed Wiley MD    Visit Date: 8/23/2024    Physician Orders: PT Eval and Treat   Medical Diagnosis from Referral:   M17.12 (ICD-10-CM) - Primary osteoarthritis of left knee   M25.562,G89.29 (ICD-10-CM) - Chronic pain of left knee   M62.81 (ICD-10-CM) - Quadriceps weakness      Evaluation Date: 8/20/2024  Authorization Period Expiration: 12/31/2024  Plan of Care Expiration: 8/20/2024 to 10/11/2024  Visit # / Visits authorized: 2/14 (+eval)  FOTO: 2/7  PTA visits: 0/5     Time In: 0840  Time Out: 0940  Total Appointment Time (timed & untimed codes): 60 minutes (2 NM, 1 TE, 1 MT)  Precautions: hyperglycemia ,HTN     Subjective     Patient reports: He has more pain when he is walking that he reports is about 8/10. When he is just sitting then he will be an 4/10.   He was compliant with home exercise program.  Response to previous treatment: soreness, good  Functional change: improved sitting tolerance    Pain: 4/10  Location: left knee     Objective      Quad set: Fair (improved with posterior tactile feedback)  Straight leg raise: unable unassisted  Range of motion: (-) 6 - 80  Wound: 1 area upper medial scar with juan miguel blood.     Treatment     Edmar received the treatments listed below:      Therapeutic exercises to develop strength, endurance, and ROM for 15 minutes including:  Seated heel slides 2x10   Short sitting heel propped calf stretch strap 5x30"   Heel prop x 5 minutes    Manual therapy techniques: total time 15 minutes, including:  Patellar mobs medial/lateral, superior/inferior grade II/III  Passive physiological knee flexion/extension grade II/III   "   Neuromuscular re-education activities to improve: Sense, Proprioception, and Posture for 30 minutes. The following activities were included:  Short-sitting heel propped quad sets 3x10  Short-sitting heel propped ankle pumps 2x20  Short-sitting heel propped Slr strap assist 3x10  Short sitting EOM 90-45 LAQs with NMES      Cold pack at home      Patient Education and Home Exercises       Education provided:   - home exercise program: continue     Written Home Exercises Provided: Pt instructed to continue prior HEP. Exercises were reviewed and Edmar was able to demonstrate them prior to the end of the session.  Edmar demonstrated good  understanding of the education provided. See Electronic Medical Record under Patient Instructions for exercises provided during therapy sessions    Assessment   A: Edmar is a 64 y.o. male.  S/p left total knee arthroplasty. Sx date: 08/19/2024. POD#4. He arrived to today's session ambulating with Rolling walker and bandage donned. Surgical bandage was removed during today's session. Some dried blood noted at superior aspect of incision without odor or discoloration. PT focused on knee extension and quad activation today. Patient reported decrease in tightness and improvement in gait at end of session.        Edmar Is progressing well towards his goals.   Patient prognosis is Excellent.     Patient will continue to benefit from skilled outpatient physical therapy to address the deficits listed in the problem list box on initial evaluation, provide pt/family education and to maximize pt's level of independence in the home and community environment.     Patient's spiritual, cultural and educational needs considered and pt agreeable to plan of care and goals.     Anticipated barriers to physical therapy: none    Goals:   Short Term Goals: 3 weeks --> progressing towards  1. Pt will be independent with HEP supplement PT in improving functional mobility.  2. Pt will improve  LLE strength to at least 60% of RLE strength as measured via MicroFet handheld dynamometer in order to improve functional mobility.  3. Pt will improve L knee AROM to at least (-) 5 - 100 in order to improve gait.     Long Term Goals: 6-8 weeks --> progressing towards  1. Pt will be independent with updated HEP supplement PT in improving functional mobility.  2. Pt will improve L LE strength to at least 70% of RLE strength as measured via MicroFet handheld dynamometer in order to improve functional mobility  3. Pt will improve L knee AROM to at least 0-120 in order to improve gait and ability to perform ADLs  4. Pt will improve FOTO knee survey score to </= 60% limited in order to demo improved functional mobility  5. Pt will improve KOOS Score by 9 points.   6. Pt will perform TUG in < 14 seconds without AD in order to demo improved gait speed  7. Pt will perform at least 5 sit to stands without UE support on 30 second sit to stand test in order to demo improved ability to perform transfers    Plan   Phase 1 post-op total knee arthroplasty.   Pain modulation  Swelling management  Progress range of motion     Miracle Yao, PT, DPT, OCS    08/23/2024

## 2024-08-26 ENCOUNTER — CLINICAL SUPPORT (OUTPATIENT)
Dept: REHABILITATION | Facility: HOSPITAL | Age: 64
End: 2024-08-26
Payer: OTHER GOVERNMENT

## 2024-08-26 DIAGNOSIS — M25.662 DECREASED RANGE OF MOTION OF LEFT KNEE: ICD-10-CM

## 2024-08-26 DIAGNOSIS — Z74.09 IMPAIRED FUNCTIONAL MOBILITY, BALANCE, GAIT, AND ENDURANCE: Primary | ICD-10-CM

## 2024-08-26 DIAGNOSIS — R29.898 DECREASED STRENGTH INVOLVING KNEE JOINT: ICD-10-CM

## 2024-08-26 PROCEDURE — 97014 ELECTRIC STIMULATION THERAPY: CPT | Mod: PN

## 2024-08-26 PROCEDURE — 97110 THERAPEUTIC EXERCISES: CPT | Mod: PN

## 2024-08-26 PROCEDURE — 97112 NEUROMUSCULAR REEDUCATION: CPT | Mod: PN

## 2024-08-26 PROCEDURE — 97140 MANUAL THERAPY 1/> REGIONS: CPT | Mod: PN

## 2024-08-26 NOTE — PROGRESS NOTES
"[facility]   Physical Therapy Treatment Note      Name: Edmar Benitez  Clinic Number: [id number]    Therapy Diagnosis:   Encounter Diagnoses   Name Primary?    Impaired functional mobility, balance, gait, and endurance Yes    Decreased range of motion of left knee     Decreased strength involving knee joint      Physician: Obed Wiley MD    Visit Date: 8/26/2024    Physician Orders: PT Eval and Treat   Medical Diagnosis from Referral:   M17.12 (ICD-10-CM) - Primary osteoarthritis of left knee   M25.562,G89.29 (ICD-10-CM) - Chronic pain of left knee   M62.81 (ICD-10-CM) - Quadriceps weakness      Evaluation Date: 8/20/2024  Authorization Period Expiration: 12/31/2024  Plan of Care Expiration: 8/20/2024 to 10/11/2024  Visit # / Visits authorized: 3/14 (+eval)  FOTO: 3/7  PTA visits: 0/5     Time In: 1000  Time Out: 1055  Total Appointment Time (timed & untimed codes): 50 minutes (1 TE, 2 NM,  1 MT, 1 ES)  Precautions: hyperglycemia, HTN     Subjective     Patient reports: feeling like he is walking better. Comes to OPPT today with single-point cane.   He was compliant with home exercise program.   Response to previous treatment: soreness, good  Functional change: improved sitting tolerance    Pain: 4/10  Location: left knee     Objective      Quad set: Fair (improved with posterior tactile feedback)  Straight leg raise: >5 degree lag.   Range of motion: (-) 8 - 105   Wound:  healing, upper steri-strips off.     Treatment     Edmar received the treatments listed below:      Therapeutic exercises to develop strength, endurance, and ROM for 10 minutes including:  Seated heel slides 2x10   Long sitting heel propped calf stretch strap 5x30"   Heel prop x 5 minutes    Manual therapy techniques: total time 15 minutes, including:  Patellar mobs medial/lateral, superior/inferior grade II/III  Passive physiological knee flexion/extension grade II/III     Neuromuscular re-education activities " to improve: Sense, Proprioception, and Posture for 25 minutes. The following activities were included:  Long-sitting quad sets with NMES (2 sec ramp, 10 on:12 off, 50Hz, 400 microseconds)  Long-sitting quad sets with strap extension assist (half bolster)  Short-sitting heel propped Slr strap assist 3x10      Patient Education and Home Exercises       Education provided:   - home exercise program: continue     Written Home Exercises Provided: Pt instructed to continue prior HEP. Exercises were reviewed and Edmar was able to demonstrate them prior to the end of the session.  Edmar demonstrated good  understanding of the education provided. See Electronic Medical Record under Patient Instructions for exercises provided during therapy sessions    Assessment   A: Edmar is a 64 y.o. male.  S/p left total knee arthroplasty. Sx date: 08/19/2024. POD#7. TKE lacking. Hamstring guarding. Quad firing volitionally poor today. Slr > 5 degree lag. Flexion range of motion goals being met.      Edmar Is progressing well towards his goals.   Patient prognosis is Excellent.     Patient will continue to benefit from skilled outpatient physical therapy to address the deficits listed in the problem list box on initial evaluation, provide pt/family education and to maximize pt's level of independence in the home and community environment.   Patient's spiritual, cultural and educational needs considered and pt agreeable to plan of care and goals.     Anticipated barriers to physical therapy: none    Goals:   Short Term Goals: 3 weeks --> progressing towards  1. Pt will be independent with HEP supplement PT in improving functional mobility.  2. Pt will improve LLE strength to at least 60% of RLE strength as measured via [employer] handheld dynamometer in order to improve functional mobility.  3. Pt will improve L knee AROM to at least (-) 5 - 100 in order to improve gait.     Long Term Goals: 6-8 weeks --> progressing  towards  1. Pt will be independent with updated HEP supplement PT in improving functional mobility.  2. Pt will improve L LE strength to at least 70% of RLE strength as measured via [employer] handheld dynamometer in order to improve functional mobility  3. Pt will improve L knee AROM to at least 0-120 in order to improve gait and ability to perform ADLs  4. Pt will improve FOTO knee survey score to </= 60% limited in order to demo improved functional mobility  5. Pt will improve KOOS Score by 9 points.   6. Pt will perform TUG in < 14 seconds without AD in order to demo improved gait speed  7. Pt will perform at least 5 sit to stands without UE support on 30 second sit to stand test in order to demo improved ability to perform transfers    Plan   Phase 1 post-op total knee arthroplasty.   Pain modulation  Swelling management  Progress range of motion     Bret Gu, PT, DPT, OCS

## 2024-08-28 ENCOUNTER — CLINICAL SUPPORT (OUTPATIENT)
Dept: REHABILITATION | Facility: HOSPITAL | Age: 64
End: 2024-08-28
Payer: OTHER GOVERNMENT

## 2024-08-28 DIAGNOSIS — M25.662 DECREASED RANGE OF MOTION OF LEFT KNEE: ICD-10-CM

## 2024-08-28 DIAGNOSIS — Z74.09 IMPAIRED FUNCTIONAL MOBILITY, BALANCE, GAIT, AND ENDURANCE: Primary | ICD-10-CM

## 2024-08-28 DIAGNOSIS — R29.898 DECREASED STRENGTH INVOLVING KNEE JOINT: ICD-10-CM

## 2024-08-28 PROCEDURE — 97112 NEUROMUSCULAR REEDUCATION: CPT | Mod: PN

## 2024-08-28 PROCEDURE — 97140 MANUAL THERAPY 1/> REGIONS: CPT | Mod: PN

## 2024-08-28 PROCEDURE — 97530 THERAPEUTIC ACTIVITIES: CPT | Mod: PN

## 2024-08-28 PROCEDURE — 97110 THERAPEUTIC EXERCISES: CPT | Mod: PN

## 2024-08-28 NOTE — PROGRESS NOTES
"[facility]   Physical Therapy Treatment Note      Name: Edmar Benitez  Clinic Number: [id number]    Therapy Diagnosis:   Encounter Diagnoses   Name Primary?    Impaired functional mobility, balance, gait, and endurance Yes    Decreased range of motion of left knee     Decreased strength involving knee joint      Physician: Obed Wiley MD    Visit Date: 8/28/2024    Physician Orders: PT Eval and Treat   Medical Diagnosis from Referral:   M17.12 (ICD-10-CM) - Primary osteoarthritis of left knee   M25.562,G89.29 (ICD-10-CM) - Chronic pain of left knee   M62.81 (ICD-10-CM) - Quadriceps weakness      Evaluation Date: 8/20/2024  Authorization Period Expiration: 12/31/2024  Plan of Care Expiration: 8/20/2024 to 10/11/2024  Visit # / Visits authorized: 4/14 (+eval)  FOTO: 4/7  PTA visits: 0/5     Time In: 1000  Time Out: 1100  Total Appointment Time (timed & untimed codes): 55 minutes (1 TE, 1 NM,  1 MT, GT)  Precautions: hyperglycemia, HTN     Subjective     Patient reports: knee stiff this morning.   He has not been compliant with home exercise program. Has been moving 'on his own'.   Response to previous treatment: soreness, good  Functional change: improved sitting tolerance    Pain: 4/10  Location: left knee     Objective      Quad set: Fair   Straight leg raise: >5 degree lag.   Range of motion: (-) 6 - 105   Wound:  healing, upper steri-strips off.     Treatment     Edmar received the treatments listed below:      Therapeutic exercises to develop strength, endurance, and ROM for 15 minutes including:  Nu-step 4' at Lv3  Seated heel slides 2x10   Long sitting heel propped calf stretch strap 5x30"     Manual therapy techniques: total time 10 minutes, including:  Patellar mobs medial/lateral, superior/inferior grade II/III  Passive physiological knee extension grade II/III/IV     Neuromuscular re-education activities to improve: Sense, Proprioception, and Posture for 15 minutes. The " following activities were included:  Long-sitting quad sets with NMES (2 sec ramp, 10 on:12 off, 50Hz, 400 microseconds) NP  Long-sitting quad sets with strap extension assist (half bolster) 2x10  (+) SAQs 4x6    Gait training: total time 10 minutes:   Cueing for knee extension during stance phase.  Walking into clinic --> observed patient with knee flexed gait and several episodes of knee buckling.  Trial of gait with focus on 'getting tall' on LLE during stance phase.       Patient Education and Home Exercises       Education provided:   - home exercise program: continue     Written Home Exercises Provided: Pt instructed to continue prior HEP. Exercises were reviewed and Edmar was able to demonstrate them prior to the end of the session.  Edmar demonstrated good  understanding of the education provided. See Electronic Medical Record under Patient Instructions for exercises provided during therapy sessions    Assessment   A: Edmar is a 64 y.o. male.  S/p left total knee arthroplasty. Sx date: 08/19/2024. POD#9. Focus of session on knee extension range of motion and re-inforcement with quad setting, self knee hinge extension range of motion/stretch, and gait.      Edmar Is progressing well towards his goals.   Patient prognosis is Excellent.     Patient will continue to benefit from skilled outpatient physical therapy to address the deficits listed in the problem list box on initial evaluation, provide pt/family education and to maximize pt's level of independence in the home and community environment.   Patient's spiritual, cultural and educational needs considered and pt agreeable to plan of care and goals.     Anticipated barriers to physical therapy: none    Goals:   Short Term Goals: 3 weeks --> progressing towards  1. Pt will be independent with HEP supplement PT in improving functional mobility.  2. Pt will improve LLE strength to at least 60% of RLE strength as measured via [employer] handheld  dynamometer in order to improve functional mobility.  3. Pt will improve L knee AROM to at least (-) 5 - 100 in order to improve gait.     Long Term Goals: 6-8 weeks --> progressing towards  1. Pt will be independent with updated HEP supplement PT in improving functional mobility.  2. Pt will improve L LE strength to at least 70% of RLE strength as measured via [employer] handheld dynamometer in order to improve functional mobility  3. Pt will improve L knee AROM to at least 0-120 in order to improve gait and ability to perform ADLs  4. Pt will improve FOTO knee survey score to </= 60% limited in order to demo improved functional mobility  5. Pt will improve KOOS Score by 9 points.   6. Pt will perform TUG in < 14 seconds without AD in order to demo improved gait speed  7. Pt will perform at least 5 sit to stands without UE support on 30 second sit to stand test in order to demo improved ability to perform transfers    Plan   Phase 1 post-op total knee arthroplasty.   Pain modulation  Swelling management  Progress range of motion     Bret L Hill, PT, DPT, OCS

## 2024-08-30 ENCOUNTER — TELEPHONE (OUTPATIENT)
Dept: ORTHOPEDICS | Facility: CLINIC | Age: 64
End: 2024-08-30
Payer: OTHER GOVERNMENT

## 2024-08-30 ENCOUNTER — CLINICAL SUPPORT (OUTPATIENT)
Dept: REHABILITATION | Facility: HOSPITAL | Age: 64
End: 2024-08-30
Payer: OTHER GOVERNMENT

## 2024-08-30 DIAGNOSIS — R29.898 DECREASED STRENGTH INVOLVING KNEE JOINT: ICD-10-CM

## 2024-08-30 DIAGNOSIS — M17.12 PRIMARY OSTEOARTHRITIS OF LEFT KNEE: ICD-10-CM

## 2024-08-30 DIAGNOSIS — M25.662 DECREASED RANGE OF MOTION OF LEFT KNEE: ICD-10-CM

## 2024-08-30 DIAGNOSIS — Z74.09 IMPAIRED FUNCTIONAL MOBILITY, BALANCE, GAIT, AND ENDURANCE: Primary | ICD-10-CM

## 2024-08-30 PROCEDURE — 97140 MANUAL THERAPY 1/> REGIONS: CPT | Mod: PN

## 2024-08-30 PROCEDURE — 97112 NEUROMUSCULAR REEDUCATION: CPT | Mod: PN

## 2024-08-30 PROCEDURE — 97110 THERAPEUTIC EXERCISES: CPT | Mod: PN

## 2024-08-30 NOTE — TELEPHONE ENCOUNTER
----- Message from Esequiel Be sent at 8/30/2024 11:49 AM CDT -----  Regarding: meds  Name of Who is Calling:Pt         WhSymptom: Knee Pain - Not From Injury    Outcome: Instruct patient to Call 911 NOW!  Reason: Can't stand (unless normally can't stand)at is the request in detail: Requesting callback in regards to pain and stronger meds             Can the clinic reply by [facility]: no         What Number to Call Back if not in [facility]:Telephone Information:  Mobile          [phone]  \

## 2024-08-30 NOTE — PROGRESS NOTES
"[facility]   Physical Therapy Treatment Note      Name: Edmar Benitez  Clinic Number: [id number]    Therapy Diagnosis:   Encounter Diagnoses   Name Primary?    Impaired functional mobility, balance, gait, and endurance Yes    Decreased range of motion of left knee     Decreased strength involving knee joint      Physician: Obed Wiley MD    Visit Date: 8/30/2024    Physician Orders: PT Eval and Treat   Medical Diagnosis from Referral:   M17.12 (ICD-10-CM) - Primary osteoarthritis of left knee   M25.562,G89.29 (ICD-10-CM) - Chronic pain of left knee   M62.81 (ICD-10-CM) - Quadriceps weakness      Evaluation Date: 8/20/2024  Authorization Period Expiration: 12/31/2024  Plan of Care Expiration: 8/20/2024 to 10/11/2024  Visit # / Visits authorized: 5/14 (+eval)  FOTO: 6/7  PTA visits: 0/5     Time In: 1200  Time Out: 1255  Total Appointment Time (timed & untimed codes): 45 minutes (1 TE, 1 NM,  1 MT)  Precautions: hyperglycemia, HTN     Subjective     Patient reports: having more knee pain today medially. Hurting starting Wednesday night into today.    He was only mildly compliant with home exercise program due to pain.   Response to previous treatment: soreness, good  Functional change: improved sitting tolerance    Pain: 4/10  Location: left knee     Objective      Quad set: Fair   Straight leg raise: >5 degree lag.   Range of motion: (-) 6 - 105   Wound:  healing, upper steri-strips off.     Treatment     Edmar received the treatments listed below:      Therapeutic exercises to develop strength, endurance, and ROM for 15 minutes including:  Nu-step 7' at Lv2  Seated heel slides 2x10   Long sitting heel propped calf stretch strap 5x30"     Manual therapy techniques: total time 15 minutes, including:  Patellar mobs medial/lateral, superior/inferior grade II/III  Passive physiological knee extension grade II/III/IV     Neuromuscular re-education activities to improve: Sense, " Proprioception, and Posture for 15 minutes. The following activities were included:  Long-sitting quad sets with NMES (2 sec ramp, 10 on:12 off, 50Hz, 400 microseconds) NP  Long-sitting quad sets with strap extension assist (half bolster) 2x10  LAQs 90-45 3x10    Cold pack x 10' for pain/inflammation control.       Patient Education and Home Exercises       Education provided:   - home exercise program: continue     Written Home Exercises Provided: Pt instructed to continue prior HEP. Exercises were reviewed and Edmar was able to demonstrate them prior to the end of the session.  Edmar demonstrated good  understanding of the education provided. See Electronic Medical Record under Patient Instructions for exercises provided during therapy sessions    Assessment   A: Edmar is a 64 y.o. male.  S/p left total knee arthroplasty. Sx date: 08/19/2024. POD#11. Poor session today due to poorly controlled pain. Medial knee and kahlil-patellar pain with knee extension range of motion or positioning. Focus of session today on pain modulation.      Edmar Is progressing well towards his goals.   Patient prognosis is Excellent.     Patient will continue to benefit from skilled outpatient physical therapy to address the deficits listed in the problem list box on initial evaluation, provide pt/family education and to maximize pt's level of independence in the home and community environment.   Patient's spiritual, cultural and educational needs considered and pt agreeable to plan of care and goals.     Anticipated barriers to physical therapy: none    Goals:   Short Term Goals: 3 weeks --> progressing towards  1. Pt will be independent with HEP supplement PT in improving functional mobility.  2. Pt will improve LLE strength to at least 60% of RLE strength as measured via [employer] handheld dynamometer in order to improve functional mobility.  3. Pt will improve L knee AROM to at least (-) 5 - 100 in order to improve  gait.     Long Term Goals: 6-8 weeks --> progressing towards  1. Pt will be independent with updated HEP supplement PT in improving functional mobility.  2. Pt will improve L LE strength to at least 70% of RLE strength as measured via [employer] handheld dynamometer in order to improve functional mobility  3. Pt will improve L knee AROM to at least 0-120 in order to improve gait and ability to perform ADLs  4. Pt will improve FOTO knee survey score to </= 60% limited in order to demo improved functional mobility  5. Pt will improve KOOS Score by 9 points.   6. Pt will perform TUG in < 14 seconds without AD in order to demo improved gait speed  7. Pt will perform at least 5 sit to stands without UE support on 30 second sit to stand test in order to demo improved ability to perform transfers    Plan   Phase 1 post-op total knee arthroplasty.   Pain modulation  Swelling management  Progress range of motion     Bret Gu, PT, DPT, OCS

## 2024-08-31 RX ORDER — CYCLOBENZAPRINE HCL 5 MG
5 TABLET ORAL NIGHTLY
Qty: 14 TABLET | Refills: 0 | Status: SHIPPED | OUTPATIENT
Start: 2024-08-31 | End: 2024-09-14

## 2024-08-31 RX ORDER — GABAPENTIN 300 MG/1
600 CAPSULE ORAL 2 TIMES DAILY
Qty: 56 CAPSULE | Refills: 0 | Status: SHIPPED | OUTPATIENT
Start: 2024-08-31 | End: 2024-09-14

## 2024-08-31 RX ORDER — DICLOFENAC SODIUM 75 MG/1
75 TABLET, DELAYED RELEASE ORAL 2 TIMES DAILY
Qty: 84 TABLET | Refills: 0 | Status: SHIPPED | OUTPATIENT
Start: 2024-08-31 | End: 2024-10-12

## 2024-08-31 NOTE — ADDENDUM NOTE
Addended by: LAURA WALKER on: 8/31/2024 05:50 PM     Modules accepted: Orders    
no chest pain, no cough, and no shortness of breath.

## 2024-09-02 ENCOUNTER — HOSPITAL ENCOUNTER (EMERGENCY)
Facility: HOSPITAL | Age: 64
Discharge: HOME | End: 2024-09-02
Attending: EMERGENCY MEDICINE
Payer: OTHER GOVERNMENT

## 2024-09-02 VITALS
HEART RATE: 73 BPM | WEIGHT: 233.94 LBS | HEIGHT: 76 IN | RESPIRATION RATE: 18 BRPM | BODY MASS INDEX: 28.49 KG/M2 | TEMPERATURE: 98 F | DIASTOLIC BLOOD PRESSURE: 86 MMHG | SYSTOLIC BLOOD PRESSURE: 142 MMHG | OXYGEN SATURATION: 96 %

## 2024-09-02 DIAGNOSIS — M25.562 LEFT KNEE PAIN: ICD-10-CM

## 2024-09-02 DIAGNOSIS — G89.18 POST-OP PAIN: Primary | ICD-10-CM

## 2024-09-02 DIAGNOSIS — M79.605 LEFT LEG PAIN: ICD-10-CM

## 2024-09-02 LAB
ALBUMIN SERPL BCP-MCNC: 4.2 G/DL (ref 3.5–5.2)
ALP SERPL-CCNC: 100 U/L (ref 55–135)
ALT SERPL W/O P-5'-P-CCNC: 40 U/L (ref 10–44)
ANION GAP SERPL CALC-SCNC: 11 MMOL/L (ref 8–16)
AST SERPL-CCNC: 29 U/L (ref 10–40)
BASOPHILS # BLD AUTO: 0.07 K/UL (ref 0–0.2)
BASOPHILS NFR BLD: 1.5 % (ref 0–1.9)
BILIRUB SERPL-MCNC: 0.3 MG/DL (ref 0.1–1)
BUN SERPL-MCNC: 15 MG/DL (ref 8–23)
CALCIUM SERPL-MCNC: 9.8 MG/DL (ref 8.7–10.5)
CHLORIDE SERPL-SCNC: 101 MMOL/L (ref 95–110)
CK SERPL-CCNC: 176 U/L (ref 20–200)
CO2 SERPL-SCNC: 27 MMOL/L (ref 23–29)
CREAT SERPL-MCNC: 1.6 MG/DL (ref 0.5–1.4)
CRP SERPL-MCNC: 3.5 MG/L (ref 0–8.2)
DIFFERENTIAL METHOD BLD: NORMAL
EOSINOPHIL # BLD AUTO: 0.4 K/UL (ref 0–0.5)
EOSINOPHIL NFR BLD: 7.5 % (ref 0–8)
ERYTHROCYTE [DISTWIDTH] IN BLOOD BY AUTOMATED COUNT: 13.7 % (ref 11.5–14.5)
EST. GFR  (NO RACE VARIABLE): 48 ML/MIN/1.73 M^2
GLUCOSE SERPL-MCNC: 124 MG/DL (ref 70–110)
HCT VFR BLD AUTO: 45.7 % (ref 40–54)
HGB BLD-MCNC: 15.4 G/DL (ref 14–18)
IMM GRANULOCYTES # BLD AUTO: 0.02 K/UL (ref 0–0.04)
IMM GRANULOCYTES NFR BLD AUTO: 0.4 % (ref 0–0.5)
LACTATE SERPL-SCNC: 1.7 MMOL/L (ref 0.5–2.2)
LYMPHOCYTES # BLD AUTO: 1.6 K/UL (ref 1–4.8)
LYMPHOCYTES NFR BLD: 32.2 % (ref 18–48)
MCH RBC QN AUTO: 27.9 PG (ref 27–31)
MCHC RBC AUTO-ENTMCNC: 33.7 G/DL (ref 32–36)
MCV RBC AUTO: 83 FL (ref 82–98)
MONOCYTES # BLD AUTO: 0.5 K/UL (ref 0.3–1)
MONOCYTES NFR BLD: 10.6 % (ref 4–15)
NEUTROPHILS # BLD AUTO: 2.3 K/UL (ref 1.8–7.7)
NEUTROPHILS NFR BLD: 47.8 % (ref 38–73)
NRBC BLD-RTO: 0 /100 WBC
PLATELET # BLD AUTO: 263 K/UL (ref 150–450)
PMV BLD AUTO: 10.5 FL (ref 9.2–12.9)
POTASSIUM SERPL-SCNC: 4.2 MMOL/L (ref 3.5–5.1)
PROT SERPL-MCNC: 7.8 G/DL (ref 6–8.4)
RBC # BLD AUTO: 5.51 M/UL (ref 4.6–6.2)
SODIUM SERPL-SCNC: 139 MMOL/L (ref 136–145)
WBC # BLD AUTO: 4.81 K/UL (ref 3.9–12.7)

## 2024-09-02 PROCEDURE — 96375 TX/PRO/DX INJ NEW DRUG ADDON: CPT

## 2024-09-02 PROCEDURE — 63600175 PHARM REV CODE 636 W HCPCS: Performed by: EMERGENCY MEDICINE

## 2024-09-02 PROCEDURE — 83605 ASSAY OF LACTIC ACID: CPT | Performed by: EMERGENCY MEDICINE

## 2024-09-02 PROCEDURE — 85025 COMPLETE CBC W/AUTO DIFF WBC: CPT | Performed by: EMERGENCY MEDICINE

## 2024-09-02 PROCEDURE — 99285 EMERGENCY DEPT VISIT HI MDM: CPT | Mod: 25

## 2024-09-02 PROCEDURE — 82550 ASSAY OF CK (CPK): CPT | Performed by: EMERGENCY MEDICINE

## 2024-09-02 PROCEDURE — 80053 COMPREHEN METABOLIC PANEL: CPT | Performed by: EMERGENCY MEDICINE

## 2024-09-02 PROCEDURE — 96374 THER/PROPH/DIAG INJ IV PUSH: CPT

## 2024-09-02 PROCEDURE — 86140 C-REACTIVE PROTEIN: CPT | Performed by: EMERGENCY MEDICINE

## 2024-09-02 RX ORDER — HYDROCODONE BITARTRATE AND ACETAMINOPHEN 10; 325 MG/1; MG/1
1 TABLET ORAL EVERY 6 HOURS PRN
Qty: 12 TABLET | Refills: 0 | Status: SHIPPED | OUTPATIENT
Start: 2024-09-02

## 2024-09-02 RX ORDER — KETOROLAC TROMETHAMINE 30 MG/ML
15 INJECTION, SOLUTION INTRAMUSCULAR; INTRAVENOUS
Status: COMPLETED | OUTPATIENT
Start: 2024-09-02 | End: 2024-09-02

## 2024-09-02 RX ORDER — MORPHINE SULFATE 4 MG/ML
4 INJECTION, SOLUTION INTRAMUSCULAR; INTRAVENOUS
Status: COMPLETED | OUTPATIENT
Start: 2024-09-02 | End: 2024-09-02

## 2024-09-02 RX ORDER — TRAMADOL HYDROCHLORIDE 50 MG/1
50 TABLET ORAL EVERY 6 HOURS PRN
Qty: 12 TABLET | Refills: 0 | Status: SHIPPED | OUTPATIENT
Start: 2024-09-02 | End: 2024-09-02 | Stop reason: CLARIF

## 2024-09-02 RX ADMIN — MORPHINE SULFATE 4 MG: 4 INJECTION INTRAVENOUS at 07:09

## 2024-09-02 RX ADMIN — KETOROLAC TROMETHAMINE 15 MG: 30 INJECTION, SOLUTION INTRAMUSCULAR; INTRAVENOUS at 10:09

## 2024-09-02 NOTE — ED PROVIDER NOTES
Encounter Date: 9/2/2024       History     Chief Complaint   Patient presents with    Post-op Problem     Patient reports having left a left knee replacement on aug 19. He states that he is having worsening pain and swelling around and below the knee. Denies fever, dyspnea, numbness to leg. The surrounding area is very warm to touch in triage.      Patient is a 64-year-old male who presents to the ED with complaint of left knee pain and left calf pain.  Patient underwent a total knee replacement on August 19, 2024 with Dr. Wiley.  He states he was doing fine immediately after surgery but over the past 3-4 days he has been experiencing pain to the left knee,  the lower anterior thigh, and the left calf region.  He states he is able to bear weight and range the left knee without significant impairment.  He states he can ambulate with the use of a cane.  He states that his prescribed pain medications are not sufficiently working for him.  He denies any recent trauma, fevers, chills, skin changes, wound dehiscence when explicitly questioned.  Furthermore, he denies any chest pain, shortness of breath, palpitations, near-syncope or syncope.      Review of patient's allergies indicates:   Allergen Reactions    Milk     Acetaminophen Nausea And Vomiting     History reviewed. No pertinent past medical history.  Past Surgical History:   Procedure Laterality Date    TOTAL KNEE ARTHROPLASTY Left 8/19/2024    Procedure: ARTHROPLASTY, KNEE, TOTAL monoblock;  Surgeon: Obed Wiley MD;  Location: [facility];  Service: Orthopedics;  Laterality: Left;  bmi - 28.47     No family history on file.  Social History     Tobacco Use    Smoking status: Unknown   Substance Use Topics    Alcohol use: Never    Drug use: Never     Review of Systems   Constitutional:  Negative for chills, fatigue and fever.   Respiratory:  Negative for cough and shortness of breath.    Cardiovascular:  Negative for chest pain, palpitations and leg swelling.    Gastrointestinal:  Negative for abdominal pain, nausea and vomiting.   Genitourinary:  Negative for difficulty urinating and frequency.   Musculoskeletal:  Positive for gait problem and joint swelling. Negative for neck pain and neck stiffness.   Neurological:  Negative for dizziness and headaches.       Physical Exam     Initial Vitals [09/02/24 0631]   BP Pulse Resp Temp SpO2   (!) 141/93 98 18 98.2 °F (36.8 °C) 98 %      MAP       --         Physical Exam    Constitutional: He appears well-developed and well-nourished. No distress.   Well-appearing, no acute distress   HENT:   Head: Normocephalic and atraumatic.   Eyes: EOM are normal. Pupils are equal, round, and reactive to light.   Neck: Neck supple.   Normal range of motion.  Cardiovascular:  Normal rate, regular rhythm, normal heart sounds and intact distal pulses.           Pulmonary/Chest: Breath sounds normal.   Abdominal: Abdomen is soft. Bowel sounds are normal.   Musculoskeletal:         General: Normal range of motion.      Cervical back: Normal range of motion and neck supple.      Comments: Well-healed vertical incision to the left knee with surrounding soft tissue swelling; no overt erythema and the patient is able to actively range the knee without significant limitation; there is no swelling or erythema; it is not warm to the touch as noted in the nurse's triage note; or palpable cords to the left lower extremity; the left lower extremity is well perfused; the compartments of the left lower extremity or compressible and free of pain on passive stretch     Neurological: He is alert and oriented to person, place, and time. He has normal strength. GCS score is 15. GCS eye subscore is 4. GCS verbal subscore is 5. GCS motor subscore is 6.   Skin: Skin is warm. Capillary refill takes less than 2 seconds. No erythema.   Psychiatric: He has a normal mood and affect. Thought content normal.         ED Course   Procedures  Labs Reviewed   COMPREHENSIVE  METABOLIC PANEL - Abnormal       Result Value    Sodium 139      Potassium 4.2      Chloride 101      CO2 27      Glucose 124 (*)     BUN 15      Creatinine 1.6 (*)     Calcium 9.8      Total Protein 7.8      Albumin 4.2      Total Bilirubin 0.3      Alkaline Phosphatase 100      AST 29      ALT 40      eGFR 48 (*)     Anion Gap 11     CBC W/ AUTO DIFFERENTIAL    WBC 4.81      RBC 5.51      Hemoglobin 15.4      Hematocrit 45.7      MCV 83      MCH 27.9      MCHC 33.7      RDW 13.7      Platelets 263      MPV 10.5      Immature Granulocytes 0.4      Gran # (ANC) 2.3      Immature Grans (Abs) 0.02      Lymph # 1.6      Mono # 0.5      Eos # 0.4      Baso # 0.07      nRBC 0      Gran % 47.8      Lymph % 32.2      Mono % 10.6      Eosinophil % 7.5      Basophil % 1.5      Differential Method Automated     LACTIC ACID, PLASMA    Lactate (Lactic Acid) 1.7     C-REACTIVE PROTEIN    CRP 3.5     CK                Imaging Results              US Lower Extremity Veins Left (Final result)  Result time 09/02/24 10:30:43      Final result by Wan Whitmore MD (09/02/24 10:30:43)                   Impression:      No evidence of DVT in the left lower extremity.    Soft tissue edema.      Electronically signed by: Wan Whitmore MD  Date:    09/02/2024  Time:    10:30               Narrative:    EXAMINATION:  US LOWER EXTREMITY VEINS LEFT    CLINICAL HISTORY:  Pain in left leg.    TECHNIQUE:  Duplex and color flow Doppler evaluation and graded compression of the left lower extremity veins was performed.    COMPARISON:  None.    FINDINGS:  The imaged veins of the left lower extremity demonstrate normal gray scale graded compression, color flow and Doppler waveforms, normal respiratory phasicity and augmentation.  Comparison imaging of the right common femoral vein is unremarkable.  No discreet fluid collections.  Mild soft tissue edema.                                       X-Ray Knee 3 View Left (Final result)  Result  time 09/02/24 08:04:42      Final result by Renan Morales MD (09/02/24 08:04:42)                   Impression:      As above.      Electronically signed by: Renan Morales MD  Date:    09/02/2024  Time:    08:04               Narrative:    EXAMINATION:  XR KNEE 3 VIEW LEFT    CLINICAL HISTORY:  Pain in left knee    TECHNIQUE:  AP, lateral, and Merchant views of the left knee were performed.    FINDINGS:  There is a knee prosthesis in place with no evidence of hardware failure.  There is no acute fracture, dislocation, or bony erosion.                                       Medications   morphine injection 4 mg (4 mg Intravenous Given 9/2/24 0734)   ketorolac injection 15 mg (15 mg Intravenous Given 9/2/24 1020)     Medical Decision Making  Amount and/or Complexity of Data Reviewed  Labs: ordered. Decision-making details documented in ED Course.  Radiology: ordered. Decision-making details documented in ED Course.    Risk  Prescription drug management.               ED Course as of 09/02/24 1136   Mon Sep 02, 2024   0733 WBC: 4.81 [LC]   0733 Hemoglobin: 15.4 [LC]   0733 Hematocrit: 45.7 [LC]   0804 CRP: 3.5 [LC]   0804 Lactic Acid Level: 1.7 [LC]   0804 CPK: 176 [LC]   0827 X-Ray Knee 3 View Left  Knee hardware in place without failure; no significant effusion; no fracture [LC]   1034 US Lower Extremity Veins Left [LC]   1034 US Lower Extremity Veins Left  No findings of DVT in the LLE per final radiology read.  [LC]   1051 Case discussed with [facility] resident (Dr. Zabala) who does not feel pt requires further emergent intervention. Will see pt in clinic.  [LC]   1056 Patient has scheduled follow up clinic appointment tomorrow with Dr. Wiley.   He was encouraged to follow-up in light of his ED presentation today. [LC]   1121 Patient says he does not want a tramadol prescription he will take a [place] prescription he understands that there is acetaminophen but he states that he does not have a significant  "reaction to it only nausea and that takes a "long time to develop. " [LC]   1124 Patient offered tramadol but said he did not wish to get this at this time due to the fact that he has had it in the past he states it "does not work."   I offered him Norco explained to him that it does teen some acetaminophen.  He states he is okay with taking as he only gets nausea with it.  This conversation was witnessed by the patient's nurse.  I will replace his tramadol prescription with [place]. [LC]      ED Course User Index  [LC] Iggy Ramos MD                 Medical Decision Making:   Initial Assessment:   See HPI  Clinical Tests:   Lab Tests: Reviewed and Ordered  Radiological Study: Ordered and Reviewed  ED Management:  - ED workup largely unremarkable; low suspicion for overlying cellulitis, septic arthritis, large effusion; his routine laboratory analysis is unremarkable; his x-ray demonstrates no findings of hardware malfunction or other acute abnormality.  Formal venous ultrasound of the left lower extremity demonstrates no findings of deep vein thrombosis.  Will treat symptomatically as an outpatient.  Patient does have follow up with Dr. Wiley tomorrow.  Patient given strict ED return precautions for any new or worsening symptoms.  Patient verbalized understanding and expressed a willingness to comply my recommendations.  - pt able to ambulate out of the ED without significant limitation  - No further intervention is indicated at this time after having taken into account the patient's history, physical exam findings, and empirical and objective data obtained during the patient's emergency department workup.   - The patient is at low risk for an emergent medical condition at this time, and I am of the belief that that it is safe to discharge the patient from the emergency department.   - The patient is instructed to follow up as outpatient as indicated on the discharge paperwork.    - I have discussed the " specifics of the workup with the patient and the patient has verbalized understanding of the details of the workup, the diagnosis, the treatment plan, and the need for outpatient follow-up.    - Although the patient has no emergent etiology today this does not preclude the development of an emergent condition so, in addition, I have advised the patient that they can return to the ED and/or activate EMS at any time with worsening of their symptoms, change of their symptoms, or with any other medical complaint.    - The patient remained comfortable and stable during their visit in the ED.    - Discharge and follow-up instructions discussed with the patient who expressed understanding and willingness to comply with my recommendations.  - Results of all emergency department tests  discussed thoroughly with patient; all patient questions answered; pt in agreement with plan  - Pt instructed to follow up with PCP in 2-3 days for recheck of today's complaints  - Pt given strict emergency department return precautions for any new or worsening of symptoms  - Pt discharged from the emergency department in stable condition, in no acute distress                Clinical Impression:  Final diagnoses:  [M25.562] Left knee pain  [M79.605] Left leg pain  [G89.18] Post-op pain (Primary)          ED Disposition Condition    Discharge Stable          ED Prescriptions       Medication Sig Dispense Start Date End Date Auth. Provider    traMADoL (ULTRAM) 50 mg tablet  (Status: Discontinued) Take 1 tablet (50 mg total) by mouth every 6 (six) hours as needed for Pain. 12 tablet 9/2/2024 9/2/2024 Iggy Ramos MD    HYDROcodone-acetaminophen (NORCO)  mg per tablet Take 1 tablet by mouth every 6 (six) hours as needed for Pain. 12 tablet 9/2/2024 -- Iggy Ramos MD          Follow-up Information       Follow up With Specialties Details Why Contact Info    Obed Wiley MD Orthopedic Surgery On 9/3/2024  [address]  [facility]  [address]  [facility] [address]  [phone]               Iggy Ramos MD  09/02/24 2378

## 2024-09-02 NOTE — ED NOTES
Pt was educated and prescribed Norco. Pt and his spouse verbalized understanding that Norco contains acetaminophen and might make him nauseous by MD and RN. Pt refused tramadol prescription.

## 2024-09-02 NOTE — DISCHARGE INSTRUCTIONS
Please follow up with Dr. Wiley as scheduled.     Do not take prescribed pain medication with cyclobenzaprine (Flexeril).

## 2024-09-03 ENCOUNTER — RESEARCH ENCOUNTER (OUTPATIENT)
Dept: RESEARCH | Facility: HOSPITAL | Age: 64
End: 2024-09-03
Payer: OTHER GOVERNMENT

## 2024-09-03 ENCOUNTER — OFFICE VISIT (OUTPATIENT)
Dept: ORTHOPEDICS | Facility: CLINIC | Age: 64
End: 2024-09-03
Payer: OTHER GOVERNMENT

## 2024-09-03 ENCOUNTER — CLINICAL SUPPORT (OUTPATIENT)
Dept: REHABILITATION | Facility: HOSPITAL | Age: 64
End: 2024-09-03
Payer: OTHER GOVERNMENT

## 2024-09-03 VITALS — BODY MASS INDEX: 28.49 KG/M2 | HEIGHT: 76 IN | WEIGHT: 233.94 LBS

## 2024-09-03 DIAGNOSIS — R29.898 DECREASED STRENGTH INVOLVING KNEE JOINT: ICD-10-CM

## 2024-09-03 DIAGNOSIS — Z47.1 AFTERCARE FOLLOWING LEFT KNEE JOINT REPLACEMENT SURGERY: Primary | ICD-10-CM

## 2024-09-03 DIAGNOSIS — M25.662 DECREASED RANGE OF MOTION OF LEFT KNEE: ICD-10-CM

## 2024-09-03 DIAGNOSIS — Z96.652 AFTERCARE FOLLOWING LEFT KNEE JOINT REPLACEMENT SURGERY: Primary | ICD-10-CM

## 2024-09-03 DIAGNOSIS — Z74.09 IMPAIRED FUNCTIONAL MOBILITY, BALANCE, GAIT, AND ENDURANCE: Primary | ICD-10-CM

## 2024-09-03 PROCEDURE — 97140 MANUAL THERAPY 1/> REGIONS: CPT | Mod: PN

## 2024-09-03 PROCEDURE — 99999 PR PBB SHADOW E&M-EST. PATIENT-LVL IV: CPT | Mod: PBBFAC,,, | Performed by: ORTHOPAEDIC SURGERY

## 2024-09-03 PROCEDURE — 99024 POSTOP FOLLOW-UP VISIT: CPT | Mod: ,,, | Performed by: ORTHOPAEDIC SURGERY

## 2024-09-03 PROCEDURE — 99214 OFFICE O/P EST MOD 30 MIN: CPT | Mod: PBBFAC,PN | Performed by: ORTHOPAEDIC SURGERY

## 2024-09-03 PROCEDURE — 97112 NEUROMUSCULAR REEDUCATION: CPT | Mod: PN

## 2024-09-03 PROCEDURE — 97110 THERAPEUTIC EXERCISES: CPT | Mod: PN

## 2024-09-03 RX ORDER — MELOXICAM 7.5 MG/1
7.5 TABLET ORAL 2 TIMES DAILY PRN
Qty: 30 TABLET | Refills: 0 | Status: SHIPPED | OUTPATIENT
Start: 2024-09-03

## 2024-09-03 RX ORDER — GABAPENTIN 300 MG/1
600 CAPSULE ORAL 2 TIMES DAILY
Qty: 56 CAPSULE | Refills: 0 | Status: SHIPPED | OUTPATIENT
Start: 2024-09-03 | End: 2024-09-17

## 2024-09-03 RX ORDER — CYCLOBENZAPRINE HCL 5 MG
5 TABLET ORAL NIGHTLY
Qty: 14 TABLET | Refills: 0 | Status: SHIPPED | OUTPATIENT
Start: 2024-09-03 | End: 2024-09-17

## 2024-09-03 NOTE — PROGRESS NOTES
Protocol: [employer] Outcomes Registry (Deidre)  Protocol#:Deidre  IRB# 2021.156  Version Date: 10-Adiel-2023  PI: Obed Wiley MD  Patient Initials: HEVER (Study ID# [id number])      Clinic Visit     Patient was met in clinic by research personnel and orthopedics provider. Patient previously had TKA to target knee (left). No injections or other interventions were given at this appointment. Patient's pain score was a 8. Patient is doing ok but has some pain. He reports to taking Hydrocodone, Gabapentin, Mobic, and Acetaminophen for pain management. After discussion with Dr. Wiley, patient was prescribed Mobic, Flexeril, and Gabapentin to help with pain. Patient will pause taking hydrocodone for now. He will continue physical therapy. He uses a cane to ambulate. He will follow up with Dr. Wiley in 3 months of sooner if needed.      Patient instructed to continue study questionnaires and to call research personnel if they have any questions. Currently in the weekly phase of study.

## 2024-09-03 NOTE — PROGRESS NOTES
[facility] [address]          Subjective:     Patient ID: Edmar Benitez is a 64 y.o. male.    Chief Complaint: Pain and Post-op Evaluation of the Left Knee      Patient is 2 weeks s/p  left primary total knee replacement  Anterior knee pain: No  Has improved pain  Is in physical therapy  Yes  Problems w incision  No  Is  happy with result  Yes  Opiod free: No    Reports increase in pain left knee 5 days ago.  Denies any known falls.  No issues with the incision.  Denies any drainage recent fevers.  Was seen in the ED 09/02/2024 due to left knee pain.  Was given Rx for Norco which has helped some with left knee pain.      History reviewed. No pertinent past medical history.     Past Surgical History:   Procedure Laterality Date    TOTAL KNEE ARTHROPLASTY Left 8/19/2024    Procedure: ARTHROPLASTY, KNEE, TOTAL monoblock;  Surgeon: Obed Wiley MD;  Location: [facility] OR;  Service: Orthopedics;  Laterality: Left;  bmi - 28.47        Current Outpatient Medications   Medication Instructions    amLODIPine (NORVASC) 10 mg    aspirin (ECOTRIN) 81 mg, Oral, 2 times daily    brimonidine (LUMIFY) 0.025 % Drop 1 drop, Ophthalmic, Every 8 hours PRN    buPROPion (WELLBUTRIN XL) 150 MG TB24 tablet 1 tablet, Oral, Daily    calcium citrate-vitamin D3 315-200 mg (CITRACAL+D) 315 mg-5 mcg (200 unit) per tablet 1 tablet, Oral, 2 times daily    cyclobenzaprine (FLEXERIL) 5 mg, Oral, Nightly    diclofenac (VOLTAREN) 75 mg, Oral, 2 times daily    diclofenac sodium (VOLTAREN) 1 % Gel Topical    empagliflozin (JARDIANCE) 25 mg tablet 1 tablet, Oral, Every morning    ergocalciferol (ERGOCALCIFEROL) 50,000 unit Cap     ezetimibe (ZETIA) 10 mg    famotidine (PEPCID) 20 mg, Oral, 2 times daily    fluticasone propionate (FLONASE) 50 mcg/actuation nasal spray INSTILL 1 SPRAY IN EACH NOSTRIL EVERY DAY AS NEEDED FOR ALLERGIES    gabapentin (NEURONTIN) 600 mg, Oral, 2 times daily    HYDROcodone-acetaminophen (NORCO)  mg per tablet 1  Patient called stating she cannot schedule appointment with Dr. Marc for an injection and she can barely lift her left arm.  It's painful with \"grinding and cracking\".    Pt said she takes Tramadol and Tylenol.  She would like a call to discuss.  [phone]   tablet, Oral, Every 6 hours PRN    Lactobacillus acidophilus Chew Oral    loratadine (CLARITIN) 10 mg    meloxicam (MOBIC) 7.5 mg, Oral, 2 times daily PRN    metFORMIN (GLUCOPHAGE) 1,000 mg, Oral    tamsulosin (FLOMAX) 0.4 mg    traZODone (DESYREL) 100 mg, Oral        Review of patient's allergies indicates:   Allergen Reactions    Milk     Acetaminophen Nausea And Vomiting       Social History     Socioeconomic History    Marital status:    Tobacco Use    Smoking status: Unknown   Substance and Sexual Activity    Alcohol use: Never    Drug use: Never       No family history on file.      Review of systems positive for .     Objective:   Physical Exam:     Left knee  Incision well healed.  No evidence of dehiscence, drainage or surrounding erythema  + effusion consistent w post-op swelling  ROM   Grossly NVI distally    X-rays obtained left knee reviewed. Components appropriately placed. No evidence of hardware complication.     Assessment:        Edmar Benitez is a 64 y.o. male with   Encounter Diagnosis   Name Primary?    Aftercare following left knee joint replacement surgery Yes       Plan :      Overall patient appears to be doing well and is happy with the result of the knee arthroplasty.  Having some postop pain.  He felt a Rx for Flexeril and gabapentin today.  They can continue activities as tolerated avoiding high impact activities.  Continue PT  I would like to see the patient back In 3 months with xrays      Sloane Zabala MD  [facility] PGY-3

## 2024-09-03 NOTE — PROGRESS NOTES
"[facility]   Physical Therapy Treatment Note      Name: Edmar Benitez  Clinic Number: [id number]    Therapy Diagnosis:   Encounter Diagnoses   Name Primary?    Impaired functional mobility, balance, gait, and endurance Yes    Decreased range of motion of left knee     Decreased strength involving knee joint      Physician: Obed Wiley MD    Visit Date: 9/3/2024    Physician Orders: PT Eval and Treat   Medical Diagnosis from Referral:   M17.12 (ICD-10-CM) - Primary osteoarthritis of left knee   M25.562,G89.29 (ICD-10-CM) - Chronic pain of left knee   M62.81 (ICD-10-CM) - Quadriceps weakness      Evaluation Date: 8/20/2024  Authorization Period Expiration: 12/31/2024  Plan of Care Expiration: 8/20/2024 to 10/11/2024  Visit # / Visits authorized: 6/14 (+eval)  FOTO: 7/7  PTA visits: 0/5     Time In: 1000  Time Out: 1055  Total Appointment Time (timed & untimed codes): 55 minutes (1 TE, 2 NM,  1 MT)  Precautions: hyperglycemia, HTN     Subjective     Patient reports: went to ED over the weekend due to pain.  Continues to have higher level medial joint line knee pain.   He was only mildly compliant with home exercise program due to pain.   Response to previous treatment: soreness, good  Functional change: improved sitting tolerance    Pain: 4/10  Location: left knee     Objective      Quad set: Good-to-Fair  Straight leg raise: >5 degree lag.   Effusion: 1+  Range of motion: (-) 10 - 110   Wound:  healing; no s/s of infection.     Treatment     Edmar received the treatments listed below:      Therapeutic exercises to develop strength, endurance, and ROM for 15 minutes including:  Nu-step 7' at Lv2  Seated heel slides 2x10   Long sitting heel propped calf stretch strap 5x30"     Manual therapy techniques: total time 15 minutes, including:  Patellar mobs medial/lateral, superior/inferior grade II/III  Passive physiological knee extension grade II/III/IV     Neuromuscular re-education " activities to improve: Sense, Proprioception, and Posture for  25 minutes. The following activities were included:  Short-sitting heel propped quad sets 2x10  Short-sitting heel propped ankle pumps 2x20  Short-sitting heel propped Slr strap assist 2x10  Short sitting EOM 90-45 LAQs 3x5    Patient Education and Home Exercises       Education provided:   - home exercise program: continue     Written Home Exercises Provided: Pt instructed to continue prior HEP. Exercises were reviewed and Edmar was able to demonstrate them prior to the end of the session.  Edmar demonstrated good  understanding of the education provided. See Electronic Medical Record under Patient Instructions for exercises provided during therapy sessions    Assessment   A: Edmar is a 64 y.o. male.  S/p left total knee arthroplasty. Sx date: 08/19/2024. POD#11. Poor session again today due to poorly controlled pain. Medial knee and kahlil-patellar pain with knee extension range of motion or positioning. Better in short-sitting heel prop than long sitting position. He is scheduled for a follow-up with Dr. Wiley today.      Edmar Is progressing well towards his goals.   Patient prognosis is Excellent.     Patient will continue to benefit from skilled outpatient physical therapy to address the deficits listed in the problem list box on initial evaluation, provide pt/family education and to maximize pt's level of independence in the home and community environment.   Patient's spiritual, cultural and educational needs considered and pt agreeable to plan of care and goals.     Anticipated barriers to physical therapy: none    Goals:   Short Term Goals: 3 weeks --> progressing towards  1. Pt will be independent with HEP supplement PT in improving functional mobility.  2. Pt will improve LLE strength to at least 60% of RLE strength as measured via MicroFet handheld dynamometer in order to improve functional mobility.  3. Pt will improve L knee  AROM to at least (-) 5 - 100 in order to improve gait.     Long Term Goals: 6-8 weeks --> progressing towards  1. Pt will be independent with updated HEP supplement PT in improving functional mobility.  2. Pt will improve L LE strength to at least 70% of RLE strength as measured via MicroFet handheld dynamometer in order to improve functional mobility  3. Pt will improve L knee AROM to at least 0-120 in order to improve gait and ability to perform ADLs  4. Pt will improve FOTO knee survey score to </= 60% limited in order to demo improved functional mobility  5. Pt will improve KOOS Score by 9 points.   6. Pt will perform TUG in < 14 seconds without AD in order to demo improved gait speed  7. Pt will perform at least 5 sit to stands without UE support on 30 second sit to stand test in order to demo improved ability to perform transfers    Plan   Phase 1 post-op total knee arthroplasty.   Pain modulation  Swelling management  Progress range of motion     Bret L Riccardo, PT, DPT, OCS

## 2024-09-04 ENCOUNTER — CLINICAL SUPPORT (OUTPATIENT)
Dept: REHABILITATION | Facility: HOSPITAL | Age: 64
End: 2024-09-04
Payer: OTHER GOVERNMENT

## 2024-09-04 DIAGNOSIS — Z74.09 IMPAIRED FUNCTIONAL MOBILITY, BALANCE, GAIT, AND ENDURANCE: Primary | ICD-10-CM

## 2024-09-04 DIAGNOSIS — M25.662 DECREASED RANGE OF MOTION OF LEFT KNEE: ICD-10-CM

## 2024-09-04 DIAGNOSIS — R29.898 DECREASED STRENGTH INVOLVING KNEE JOINT: ICD-10-CM

## 2024-09-04 PROCEDURE — 97110 THERAPEUTIC EXERCISES: CPT | Mod: PN,CQ

## 2024-09-04 PROCEDURE — 97112 NEUROMUSCULAR REEDUCATION: CPT | Mod: PN,CQ

## 2024-09-04 PROCEDURE — 97140 MANUAL THERAPY 1/> REGIONS: CPT | Mod: PN,CQ

## 2024-09-04 NOTE — PROGRESS NOTES
"[facility]   Physical Therapy Treatment Note      Name: Edmar Benitez  Clinic Number: [id number]    Therapy Diagnosis:   Encounter Diagnoses   Name Primary?    Impaired functional mobility, balance, gait, and endurance Yes    Decreased range of motion of left knee     Decreased strength involving knee joint      Physician: Obed Wiley MD    Visit Date: 9/4/2024    Physician Orders: PT Eval and Treat   Medical Diagnosis from Referral:   M17.12 (ICD-10-CM) - Primary osteoarthritis of left knee   M25.562,G89.29 (ICD-10-CM) - Chronic pain of left knee   M62.81 (ICD-10-CM) - Quadriceps weakness      Evaluation Date: 8/20/2024  Authorization Period Expiration: 12/31/2024  Plan of Care Expiration: 8/20/2024 to 10/11/2024  Visit # / Visits authorized: 7/14 (+eval)  FOTO: 7/7  PTA visits: 1/5     Time In: 1100  Time Out: 1155  Total Appointment Time (timed & untimed codes): 55 minutes (1 TE, 2 NM,  1 MT)  Precautions: hyperglycemia, HTN     Subjective     Patient reports: Moderate pain ongoing. No new complaints. Would like to transfer care to [facility] as this is where he lives.  He was only mildly compliant with home exercise program due to pain.   Response to previous treatment: soreness, good  Functional change: improved sitting tolerance    Pain: 4/10  Location: left knee     Objective      Quad set: Good-to-Fair  Straight leg raise: >5 degree lag.   Effusion: 1+  Range of motion: (-) 10 - 110   Wound:  healing; no s/s of infection.     Treatment     Edmar received the treatments listed below:      Therapeutic exercises to develop strength, endurance, and ROM for 15 minutes including:  Nu-step 7' at Lv2  Seated heel slides 2x10   Long sitting heel propped calf stretch strap 5x30"     Manual therapy techniques: total time 15 minutes, including:  Patellar mobs medial/lateral, superior/inferior grade II/III  Passive physiological knee extension grade II/III/IV     Neuromuscular " re-education activities to improve: Sense, Proprioception, and Posture for  25 minutes. The following activities were included:  Short-sitting heel propped quad sets 2x10  Short-sitting heel propped ankle pumps 2x20  Short-sitting heel propped Slr strap assist 2x10  Short sitting EOM 90-45 LAQs 3x5    Patient Education and Home Exercises       Education provided:   - home exercise program: continue     Written Home Exercises Provided: Pt instructed to continue prior HEP. Exercises were reviewed and Edmar was able to demonstrate them prior to the end of the session.  Edmar demonstrated good  understanding of the education provided. See Electronic Medical Record under Patient Instructions for exercises provided during therapy sessions    Assessment   A: Edmar is a 64 y.o. male.  S/p left total knee arthroplasty. Sx date: 08/19/2024. POD#16. Poorly controlled pain. Medial knee and kahlil-patellar pain with knee extension range of motion or positioning. Unable to tolerate heel prop for prolonged time. Reports good follow up with MD. Will continue to progress as appropriate. Possible transfer to clinic closer to home.      Edmar Is progressing well towards his goals.   Patient prognosis is Excellent.     Patient will continue to benefit from skilled outpatient physical therapy to address the deficits listed in the problem list box on initial evaluation, provide pt/family education and to maximize pt's level of independence in the home and community environment.   Patient's spiritual, cultural and educational needs considered and pt agreeable to plan of care and goals.     Anticipated barriers to physical therapy: none    Goals:   Short Term Goals: 3 weeks --> progressing towards  1. Pt will be independent with HEP supplement PT in improving functional mobility.  2. Pt will improve LLE strength to at least 60% of RLE strength as measured via [employer] handheld dynamometer in order to improve functional  mobility.  3. Pt will improve L knee AROM to at least (-) 5 - 100 in order to improve gait.     Long Term Goals: 6-8 weeks --> progressing towards  1. Pt will be independent with updated HEP supplement PT in improving functional mobility.  2. Pt will improve L LE strength to at least 70% of RLE strength as measured via [employer] handheld dynamometer in order to improve functional mobility  3. Pt will improve L knee AROM to at least 0-120 in order to improve gait and ability to perform ADLs  4. Pt will improve FOTO knee survey score to </= 60% limited in order to demo improved functional mobility  5. Pt will improve KOOS Score by 9 points.   6. Pt will perform TUG in < 14 seconds without AD in order to demo improved gait speed  7. Pt will perform at least 5 sit to stands without UE support on 30 second sit to stand test in order to demo improved ability to perform transfers    Plan   Phase 1 post-op total knee arthroplasty.   Pain modulation  Swelling management  Progress range of motion     Tina Ortiz, PTA

## 2024-09-06 ENCOUNTER — CLINICAL SUPPORT (OUTPATIENT)
Dept: REHABILITATION | Facility: HOSPITAL | Age: 64
End: 2024-09-06
Payer: OTHER GOVERNMENT

## 2024-09-06 DIAGNOSIS — R29.898 DECREASED STRENGTH INVOLVING KNEE JOINT: ICD-10-CM

## 2024-09-06 DIAGNOSIS — Z74.09 IMPAIRED FUNCTIONAL MOBILITY, BALANCE, GAIT, AND ENDURANCE: Primary | ICD-10-CM

## 2024-09-06 DIAGNOSIS — M25.662 DECREASED RANGE OF MOTION OF LEFT KNEE: ICD-10-CM

## 2024-09-06 PROCEDURE — 97112 NEUROMUSCULAR REEDUCATION: CPT | Mod: PN

## 2024-09-06 PROCEDURE — 97110 THERAPEUTIC EXERCISES: CPT | Mod: PN

## 2024-09-06 PROCEDURE — 97140 MANUAL THERAPY 1/> REGIONS: CPT | Mod: PN

## 2024-09-06 NOTE — PROGRESS NOTES
[facility]   Physical Therapy Treatment Note      Name: Edmar Benitez  Clinic Number: [id number]    Therapy Diagnosis:   Encounter Diagnoses   Name Primary?    Impaired functional mobility, balance, gait, and endurance Yes    Decreased range of motion of left knee     Decreased strength involving knee joint        Physician: Obed Wiley MD    Visit Date: 9/6/2024    Physician Orders: PT Eval and Treat   Medical Diagnosis from Referral:   M17.12 (ICD-10-CM) - Primary osteoarthritis of left knee   M25.562,G89.29 (ICD-10-CM) - Chronic pain of left knee   M62.81 (ICD-10-CM) - Quadriceps weakness      Evaluation Date: 8/20/2024  Authorization Period Expiration: 12/31/2024  Plan of Care Expiration: 8/20/2024 to 10/11/2024  Visit # / Visits authorized: 8/14 (+eval)  FOTO: 7/7  PTA visits: 1/5     Time In: 11:00  Time Out: 12:00  Total Appointment Time (timed & untimed codes): 60 minutes (1 TE, 2 NM,  1 MT)  Precautions: hyperglycemia, HTN     Subjective     Patient reports: Not taking any prescription pain medication due to recent constipation/pain, feeling sick. Constipation passed.  He was only mildly compliant with home exercise program due to pain.   Response to previous treatment: soreness, good  Functional change: improved sitting tolerance    Pain: 5/10 walking; 0/10 at rest.  Location: left knee     Objective    9/6/24:  Quad set: Good-to-Fair  Straight leg raise: >5 degree lag.   Effusion: 1+  Range of motion:   Active extension:  (-) 7  degrees  Active assist (with strap) flexion:  128 degrees  Wound:  healing; no s/s of infection. Raised incision site over patella.    Treatment     Edmar received the treatments listed below:      Therapeutic exercises to develop strength, endurance, and ROM for 15 minutes including:  Stationary Bike, seat position: 18 at Lv2  Supine heel slides 2x10   Long sitting heel propped calf stretch strap 3x1'     Manual therapy techniques: total time  15 minutes, including:  Patellar mobs medial/lateral, superior/inferior grade II/III  Passive physiological knee extension grade II/III/IV  Scar mobilization to promote improved tissue mobility over patella.     Neuromuscular re-education activities to improve: Sense, Proprioception, and Posture for  25 minutes. The following activities were included:  Supine heel propped quad sets 3x10  Short-sitting heel propped ankle pumps 2x20  Supine active SLR (slight lag) 4x5 reps, 3'' hold  Short sitting EOM 90-45 LAQs 3x5- Next session with resistance    Therapeutic activity: 5 mins  Ambulation x 2 laps (155 ft) with use of SPC, emphasis on initial contact (heel strike) to promote quad contraction, knee extension, normal gait mechanics.    Patient Education and Home Exercises       Education provided:   - home exercise program: continue     Written Home Exercises Provided: Pt instructed to continue prior HEP. Exercises were reviewed and Edmar was able to demonstrate them prior to the end of the session.  Edmar demonstrated good  understanding of the education provided. See Electronic Medical Record under Patient Instructions for exercises provided during therapy sessions    Assessment   A: Edmar is a 64 y.o. male.  S/p left total knee arthroplasty. Sx date: 08/19/2024. 3 weeks post-op. Improved pain control today, recent hx of constipation that has passed. Patient demonstrates improved active range of motion with flexion and extension today. Able to perform an active straight leg raise independently with lag noted.  Gait mechanics normalizing as well. Continues to require verbal cueing for heel strike at initial contact.  Recommend continuing POC to restore full AROM and strength in L knee. Still considering transfer to [facility].     Edmar Is progressing well towards his goals.   Patient prognosis is Excellent.     Patient will continue to benefit from skilled outpatient physical therapy to address  the deficits listed in the problem list box on initial evaluation, provide pt/family education and to maximize pt's level of independence in the home and community environment.   Patient's spiritual, cultural and educational needs considered and pt agreeable to plan of care and goals.     Anticipated barriers to physical therapy: none    Goals:   Short Term Goals: 3 weeks --> progressing towards  1. Pt will be independent with HEP supplement PT in improving functional mobility.  2. Pt will improve LLE strength to at least 60% of RLE strength as measured via [employer] handheld dynamometer in order to improve functional mobility.  3. Pt will improve L knee AROM to at least (-) 5 - 100 in order to improve gait.     Long Term Goals: 6-8 weeks --> progressing towards  1. Pt will be independent with updated HEP supplement PT in improving functional mobility.  2. Pt will improve L LE strength to at least 70% of RLE strength as measured via MicroFet handheld dynamometer in order to improve functional mobility  3. Pt will improve L knee AROM to at least 0-120 in order to improve gait and ability to perform ADLs  4. Pt will improve FOTO knee survey score to </= 60% limited in order to demo improved functional mobility  5. Pt will improve KOOS Score by 9 points.   6. Pt will perform TUG in < 14 seconds without AD in order to demo improved gait speed  7. Pt will perform at least 5 sit to stands without UE support on 30 second sit to stand test in order to demo improved ability to perform transfers    Plan   Phase 1 post-op total knee arthroplasty.   Pain modulation  Swelling management  Progress range of motion     Esther Mosley, PT, DPT  9/6/2024

## 2024-09-13 ENCOUNTER — CLINICAL SUPPORT (OUTPATIENT)
Dept: REHABILITATION | Facility: HOSPITAL | Age: 64
End: 2024-09-13
Payer: OTHER GOVERNMENT

## 2024-09-13 DIAGNOSIS — Z74.09 IMPAIRED FUNCTIONAL MOBILITY, BALANCE, GAIT, AND ENDURANCE: Primary | ICD-10-CM

## 2024-09-13 DIAGNOSIS — M25.662 DECREASED RANGE OF MOTION OF LEFT KNEE: ICD-10-CM

## 2024-09-13 DIAGNOSIS — R29.898 DECREASED STRENGTH INVOLVING KNEE JOINT: ICD-10-CM

## 2024-09-13 PROCEDURE — 97140 MANUAL THERAPY 1/> REGIONS: CPT | Mod: PN

## 2024-09-13 PROCEDURE — 97112 NEUROMUSCULAR REEDUCATION: CPT | Mod: PN

## 2024-09-13 PROCEDURE — 97110 THERAPEUTIC EXERCISES: CPT | Mod: PN

## 2024-09-13 PROCEDURE — 97530 THERAPEUTIC ACTIVITIES: CPT | Mod: PN

## 2024-09-16 NOTE — PROGRESS NOTES
[facility]   Physical Therapy Treatment Note      Name: Edmar Benitez  Clinic Number: [id number]    Therapy Diagnosis:   Encounter Diagnoses   Name Primary?    Impaired functional mobility, balance, gait, and endurance Yes    Decreased range of motion of left knee     Decreased strength involving knee joint        Physician: Obed Wiley MD    Visit Date: 9/13/2024    Physician Orders: PT Eval and Treat   Medical Diagnosis from Referral:   M17.12 (ICD-10-CM) - Primary osteoarthritis of left knee   M25.562,G89.29 (ICD-10-CM) - Chronic pain of left knee   M62.81 (ICD-10-CM) - Quadriceps weakness      Evaluation Date: 8/20/2024  Authorization Period Expiration: 12/31/2024  Plan of Care Expiration: 8/20/2024 to 10/11/2024  Visit # / Visits authorized: 9/14 (+eval)  FOTO: next PTA visits: 0/5     Time In: 1000  Time Out: 1055  Total Appointment Time (timed & untimed codes): 55 minutes (1 TE, 1 TH, 1 NM 1 MT)  Precautions: hyperglycemia, HTN     Subjective     Patient reports: knee doing some better.  He has not been compliant with home exercise program due to pain.   Response to previous treatment: soreness, good  Functional change: improved sitting tolerance    Pain: 5/10 walking; 0/10 at rest.  Location: left knee     Objective    9/13/24:  Quad set: Good  Straight leg raise: 3-5 degree lag.   Effusion: 1+  Range of motion:   Active extension:  (-) 7  degrees  Active assist (with strap) flexion:  128 degrees  Wound:  healing; no s/s of infection. Raised incision site over patella.    Treatment     Edmar received the treatments listed below:      Therapeutic exercises to develop strength, endurance, and ROM for 15 minutes including:  Stationary Bike, seat position: 18 at twin peaks Lv4.5 x 8'  Knee extension hinge stretch with strap assist 2x10    Manual therapy techniques: total time 10 minutes, including:  Patellar mobs medial/lateral, superior/inferior grade II/III  Passive  physiological knee extension grade II/III/IV  Scar mobilization to promote improved tissue mobility over patella.     Neuromuscular re-education activities to improve: Sense, Proprioception, and Posture for  20 minutes. The following activities were included:  Supine heel propped quad sets 3x10  Short-sitting heel propped ankle pumps 2x20  Supine active SLR (slight lag) 4x5 reps, 3'' hold  Short sitting EOM 90-45 LAQs 3x10 yellow tB    Therapeutic activity: 10 mins  Leg press 5 plates 3x10 dL     Patient Education and Home Exercises       Education provided:   - discussed need to focus on extension home exercise program exercises.     Written Home Exercises Provided: Pt instructed to continue prior HEP. Exercises were reviewed and Edmar was able to demonstrate them prior to the end of the session.  Edmar demonstrated good  understanding of the education provided. See Electronic Medical Record under Patient Instructions for exercises provided during therapy sessions    Assessment   A: Edmar is a 64 y.o. male.  S/p left total knee arthroplasty. Sx date: 08/19/2024. 3 weeks post-op. Pain mostly with active TKE; worse in WB position. Pinching sensation.     Edmar Is progressing well towards his goals.   Patient prognosis is Excellent.     Patient will continue to benefit from skilled outpatient physical therapy to address the deficits listed in the problem list box on initial evaluation, provide pt/family education and to maximize pt's level of independence in the home and community environment.   Patient's spiritual, cultural and educational needs considered and pt agreeable to plan of care and goals.     Anticipated barriers to physical therapy: none    Goals:   Short Term Goals: 3 weeks   1. Pt will be independent with HEP supplement PT in improving functional mobility.  Met  2. Pt will improve LLE strength to at least 60% of RLE strength as measured via [employer] handheld dynamometer in order to  improve functional mobility.  3. Pt will improve L knee AROM to at least (-) 5 - 100 in order to improve gait.     Long Term Goals: 6-8 weeks --> progressing towards  1. Pt will be independent with updated HEP supplement PT in improving functional mobility.  2. Pt will improve L LE strength to at least 70% of RLE strength as measured via [employer] handheld dynamometer in order to improve functional mobility  3. Pt will improve L knee AROM to at least 0-120 in order to improve gait and ability to perform ADLs  4. Pt will improve FOTO knee survey score to </= 60% limited in order to demo improved functional mobility  5. Pt will improve KOOS Score by 9 points.   6. Pt will perform TUG in < 14 seconds without AD in order to demo improved gait speed  7. Pt will perform at least 5 sit to stands without UE support on 30 second sit to stand test in order to demo improved ability to perform transfers    Plan   Phase 1 post-op total knee arthroplasty.   Pain modulation  Swelling management  Progress range of motion     Bret Gu, PT, DPT, OCS  9/16/2024

## 2024-09-17 ENCOUNTER — CLINICAL SUPPORT (OUTPATIENT)
Dept: REHABILITATION | Facility: HOSPITAL | Age: 64
End: 2024-09-17
Payer: OTHER GOVERNMENT

## 2024-09-17 DIAGNOSIS — R29.898 DECREASED STRENGTH INVOLVING KNEE JOINT: ICD-10-CM

## 2024-09-17 DIAGNOSIS — Z74.09 IMPAIRED FUNCTIONAL MOBILITY, BALANCE, GAIT, AND ENDURANCE: Primary | ICD-10-CM

## 2024-09-17 DIAGNOSIS — M25.662 DECREASED RANGE OF MOTION OF LEFT KNEE: ICD-10-CM

## 2024-09-17 PROCEDURE — 97530 THERAPEUTIC ACTIVITIES: CPT | Mod: PN

## 2024-09-17 PROCEDURE — 97110 THERAPEUTIC EXERCISES: CPT | Mod: PN

## 2024-09-17 PROCEDURE — 97140 MANUAL THERAPY 1/> REGIONS: CPT | Mod: PN

## 2024-09-17 PROCEDURE — 97112 NEUROMUSCULAR REEDUCATION: CPT | Mod: PN

## 2024-09-17 NOTE — PROGRESS NOTES
[facility]   Physical Therapy Treatment Note      Name: Edmar Benitez  Clinic Number: [id number]    Therapy Diagnosis:   Encounter Diagnoses   Name Primary?    Impaired functional mobility, balance, gait, and endurance Yes    Decreased range of motion of left knee     Decreased strength involving knee joint        Physician: Obed Wiley MD    Visit Date: 9/17/2024    Physician Orders: PT Eval and Treat   Medical Diagnosis from Referral:   M17.12 (ICD-10-CM) - Primary osteoarthritis of left knee   M25.562,G89.29 (ICD-10-CM) - Chronic pain of left knee   M62.81 (ICD-10-CM) - Quadriceps weakness      Evaluation Date: 8/20/2024  Authorization Period Expiration: 12/31/2024  Plan of Care Expiration: 8/20/2024 to 10/11/2024  Visit # / Visits authorized: 10/14 (+eval)  FOTO: next PTA visits: 0/5     Time In: 9:00  Time Out: 10:00  Total Appointment Time (timed & untimed codes): 60 minutes (1 TE, 1 TH, 1 NM 1 MT)  Precautions: hyperglycemia, HTN     Subjective     Patient reports: knee has been hurting more today. He has difficulty sleeping, feels pain is not controlled well. Takes a long time to recover when he works on extension.   He has not been compliant with home exercise program due to pain.   Response to previous treatment: soreness, good  Functional change: improved sitting tolerance    Pain: 8/10 walking; 0/10 at rest.  Location: left knee     Objective    9/13/24:  Quad set: Good  Straight leg raise: 3-5 degree lag.   Effusion: 1+  Range of motion:   Active extension:  (-) 7  degrees  Active assist (with strap) flexion:  128 degrees  Wound:  healing; no s/s of infection. Raised incision site over patella.    Treatment     Edmar received the treatments listed below:      Therapeutic exercises to develop strength, endurance, and ROM for 15 minutes including:  Stationary Bike, seat position: 18 at twin peaks Lv4.5 x 8'  Knee extension hinge stretch with strap assist 2x10    Manual  "therapy techniques: total time 10 minutes, including:  Patellar mobs medial/lateral, superior/inferior grade II/III  Passive physiological knee extension grade II/III/IV  Scar mobilization to promote improved tissue mobility over patella.  Tibial distraction seated      Neuromuscular re-education activities to improve: Sense, Proprioception, and Posture for  20 minutes. The following activities were included:  Supine heel propped quad sets 3x10  Short-sitting heel propped ankle pumps 2x20  Supine active straight leg raise  (slight lag) 4x5 reps, 3'' hold  Short sitting EOM 90-45 long arc quad  3x10 yellow tB  +Short arc quads 2x10 3" hold     Therapeutic activity: 15 mins  Leg press 5 plates 3x10 dL   +Heel raises 2x10 - difficulty maintaining knee extension   +TKE pink sports cord 1x10    Patient Education and Home Exercises       Education provided:   - discussed need to focus on extension home exercise program exercises.     Written Home Exercises Provided: Pt instructed to continue prior HEP. Exercises were reviewed and Edmar was able to demonstrate them prior to the end of the session.  Edmar demonstrated good  understanding of the education provided. See Electronic Medical Record under Patient Instructions for exercises provided during therapy sessions    Assessment   A: Edmar is a 64 y.o. male.  S/p left total knee arthroplasty. Sx date: 08/19/2024. 4 weeks 1 day post-op. Difficulty achieving TKE due to stiffness and pain. Pain greatest with active TKE. Deep ache reported. Continuing to promote extension range of motion along with low loading quad strengthening. Continue to progress as tolerated.     Edmar Is progressing well towards his goals.   Patient prognosis is Excellent.     Patient will continue to benefit from skilled outpatient physical therapy to address the deficits listed in the problem list box on initial evaluation, provide pt/family education and to maximize pt's level of " independence in the home and community environment.   Patient's spiritual, cultural and educational needs considered and pt agreeable to plan of care and goals.     Anticipated barriers to physical therapy: none    Goals:   Short Term Goals: 3 weeks   1. Pt will be independent with HEP supplement PT in improving functional mobility.  Met  2. Pt will improve LLE strength to at least 60% of RLE strength as measured via [employer] handheld dynamometer in order to improve functional mobility.  3. Pt will improve L knee AROM to at least (-) 5 - 100 in order to improve gait.     Long Term Goals: 6-8 weeks --> progressing towards  1. Pt will be independent with updated HEP supplement PT in improving functional mobility.  2. Pt will improve L LE strength to at least 70% of RLE strength as measured via [employer] handheld dynamometer in order to improve functional mobility  3. Pt will improve L knee AROM to at least 0-120 in order to improve gait and ability to perform ADLs  4. Pt will improve FOTO knee survey score to </= 60% limited in order to demo improved functional mobility  5. Pt will improve KOOS Score by 9 points.   6. Pt will perform TUG in < 14 seconds without AD in order to demo improved gait speed  7. Pt will perform at least 5 sit to stands without UE support on 30 second sit to stand test in order to demo improved ability to perform transfers    Plan   Phase 1 post-op total knee arthroplasty.   Pain modulation  Swelling management  Progress range of motion     BRITTNEY Scott, PT, DPT, OCS  9/17/2024

## 2024-09-18 ENCOUNTER — TELEPHONE (OUTPATIENT)
Dept: RESEARCH | Facility: HOSPITAL | Age: 64
End: 2024-09-18
Payer: OTHER GOVERNMENT

## 2024-09-18 NOTE — TELEPHONE ENCOUNTER
[facility] Study  Study ID#[id number]    Left voicemail reminding patient to complete week 4 questionnaires which are due today. Another email reminder was also sent.

## 2024-09-19 ENCOUNTER — CLINICAL SUPPORT (OUTPATIENT)
Dept: REHABILITATION | Facility: HOSPITAL | Age: 64
End: 2024-09-19
Payer: OTHER GOVERNMENT

## 2024-09-19 DIAGNOSIS — R29.898 DECREASED STRENGTH INVOLVING KNEE JOINT: ICD-10-CM

## 2024-09-19 DIAGNOSIS — Z74.09 IMPAIRED FUNCTIONAL MOBILITY, BALANCE, GAIT, AND ENDURANCE: Primary | ICD-10-CM

## 2024-09-19 DIAGNOSIS — M25.662 DECREASED RANGE OF MOTION OF LEFT KNEE: ICD-10-CM

## 2024-09-19 PROCEDURE — 97112 NEUROMUSCULAR REEDUCATION: CPT | Mod: PN

## 2024-09-19 PROCEDURE — 97530 THERAPEUTIC ACTIVITIES: CPT | Mod: PN

## 2024-09-19 PROCEDURE — 97140 MANUAL THERAPY 1/> REGIONS: CPT | Mod: PN

## 2024-09-19 NOTE — PROGRESS NOTES
[facility]   Physical Therapy Treatment Note      Name: Edmar Benitez  Clinic Number: [id number]    Therapy Diagnosis:   Encounter Diagnoses   Name Primary?    Impaired functional mobility, balance, gait, and endurance Yes    Decreased range of motion of left knee     Decreased strength involving knee joint        Physician: Obed Wiley MD    Visit Date: 9/19/2024    Physician Orders: PT Eval and Treat   Medical Diagnosis from Referral:   M17.12 (ICD-10-CM) - Primary osteoarthritis of left knee   M25.562,G89.29 (ICD-10-CM) - Chronic pain of left knee   M62.81 (ICD-10-CM) - Quadriceps weakness      Evaluation Date: 8/20/2024  Authorization Period Expiration: 12/31/2024  Plan of Care Expiration: 8/20/2024 to 10/11/2024  Visit # / Visits authorized: 11/14 (+eval)  FOTO: next PTA visits: 0/5     Time In: 9:00  Time Out: 10:00  Total Appointment Time (timed & untimed codes): 55 minutes (1 TH,  1 NM 2 MT)  Precautions: hyperglycemia, HTN     Subjective     Patient reports: continued high level knee pain. Unable to sleep. Unable to participate in home exercise program due to pain.   He has not been compliant with home exercise program due to pain.   Response to previous treatment: soreness, good  Functional change: improved sitting tolerance    Pain: 8/10 walking; 5/10 at rest.  Location: left knee     Objective      Quad set: Good  Straight leg raise: 3-5 degree lag.   Effusion: 1+  Range of motion:   Passive extension:  (-) 4  degrees (resting 10 degrees)  Active assist (with strap) flexion:  130 degrees  Wound:  healing; no s/s of infection. Raised incision site over patella.    Treatment     Edmar received the treatments listed below:      Therapeutic exercises to develop strength, endurance, and ROM for 5 minutes including:  Stationary Bike, seat position: 18 at twin peaks Lv4.5 x 5'    Manual therapy techniques: total time 25 minutes, including:  Patellar mobs medial/lateral,  "superior/inferior grade II/III  Passive physiological knee extension grade II/III/IV  Scar mobilization to promote improved tissue mobility over patella.  Tibial distraction seated      Neuromuscular re-education activities to improve: Sense, Proprioception, and Posture for  15 minutes. The following activities were included:  Short-sitting heel propped ankle pumps 2x20  Supine active straight leg raise  (slight lag) 4x5 reps, 3'' hold  Short sitting EOM 90-45 long arc quad  3x10 yellow tB  Short arc quads 2x10 3" hold     Therapeutic activity: 10 mins  TKE pink sports cord 3x10    Patient Education and Home Exercises       Education provided:   - discussed need to focus on extension home exercise program exercises.     Written Home Exercises Provided: Pt instructed to continue prior HEP. Exercises were reviewed and Edmar was able to demonstrate them prior to the end of the session.  Edmar demonstrated good  understanding of the education provided. See Electronic Medical Record under Patient Instructions for exercises provided during therapy sessions    Assessment   A: Edmar is a 64 y.o. male.  S/p left total knee arthroplasty. Sx date: 08/19/2024. 4 weeks 4 day post-op. Continues to lack TKE passively and actively although this is improving. High focus by patient on pain. Not participating in focused interventions/HeP outside of OPPT due to pain.     Edmar Is progressing well towards his goals.   Patient prognosis is Excellent.     Patient will continue to benefit from skilled outpatient physical therapy to address the deficits listed in the problem list box on initial evaluation, provide pt/family education and to maximize pt's level of independence in the home and community environment.   Patient's spiritual, cultural and educational needs considered and pt agreeable to plan of care and goals.     Anticipated barriers to physical therapy: none    Goals:   Short Term Goals: 3 weeks   1. Pt will be " independent with HEP supplement PT in improving functional mobility.  Met  2. Pt will improve LLE strength to at least 60% of RLE strength as measured via MicroFet handheld dynamometer in order to improve functional mobility.  3. Pt will improve L knee AROM to at least (-) 5 - 100 in order to improve gait.     Long Term Goals: 6-8 weeks --> progressing towards  1. Pt will be independent with updated HEP supplement PT in improving functional mobility.  2. Pt will improve L LE strength to at least 70% of RLE strength as measured via MicroFet handheld dynamometer in order to improve functional mobility  3. Pt will improve L knee AROM to at least 0-120 in order to improve gait and ability to perform ADLs  4. Pt will improve FOTO knee survey score to </= 60% limited in order to demo improved functional mobility  5. Pt will improve KOOS Score by 9 points.   6. Pt will perform TUG in < 14 seconds without AD in order to demo improved gait speed  7. Pt will perform at least 5 sit to stands without UE support on 30 second sit to stand test in order to demo improved ability to perform transfers    Plan   Phase 1-2 post-op total knee arthroplasty.   Pain modulation  Swelling management  Progress range of motion     Bret Gu, PT, DPT, OCS  9/20/2024

## 2024-09-20 ENCOUNTER — TELEPHONE (OUTPATIENT)
Dept: ORTHOPEDICS | Facility: CLINIC | Age: 64
End: 2024-09-20
Payer: OTHER GOVERNMENT

## 2024-09-20 RX ORDER — TRAMADOL HYDROCHLORIDE 50 MG/1
50 TABLET ORAL EVERY 12 HOURS PRN
Qty: 28 TABLET | Refills: 0 | Status: SHIPPED | OUTPATIENT
Start: 2024-09-20 | End: 2024-10-04

## 2024-09-20 NOTE — TELEPHONE ENCOUNTER
----- Message from Esequiel eB sent at 9/20/2024 11:36 AM CDT -----  Regarding: meds  Can the clinic reply in [facility]: no         Please refill the medication(s) listed below.         Please call the patient when the prescription(s) is ready for  at this phone number Telephone Information:  Mobile          [phone]               Medication #1 (NORCO)  mg per      Medication #2       Preferred Pharmacy:   [facility]/pharmacy #[id number]  [place] - [id number] [facility]  [address] [place] [address]  Phone: [phone] Fax: [phone]

## 2024-09-23 ENCOUNTER — TELEPHONE (OUTPATIENT)
Dept: ORTHOPEDICS | Facility: CLINIC | Age: 64
End: 2024-09-23
Payer: OTHER GOVERNMENT

## 2024-09-23 ENCOUNTER — NURSE TRIAGE (OUTPATIENT)
Dept: ADMINISTRATIVE | Facility: CLINIC | Age: 64
End: 2024-09-23
Payer: OTHER GOVERNMENT

## 2024-09-23 NOTE — TELEPHONE ENCOUNTER
Patient wants to be called about his knee pain, will send message to staff. Patient may not be aware he was sent in pain meds.

## 2024-09-23 NOTE — TELEPHONE ENCOUNTER
"----- Message from Faiza Hernandez sent at 9/23/2024 11:45 AM CDT -----    Pt Requesting Call Back    Who called: pt  Who called for pt:  Best call back #:  [phone]  Add notes: pt would like to speak to someone; pt said he does not know what to do about his left leg (from ankle to groin where surgery was done) pain; pt said he needs a different med prescribed for his pain than the one he was previously prescribed     pt declined to go in for an appt saying "I don't want to drive up my insurance"    Symptoms: Leg Pain - Not From Injury  Outcome: Talk to a nurse or provider within 15 minutes.  Reason: Severe pain now     I also transferred pt's call to on call nurse    When I asked pt did he feel like he wants to harm himself (due to the things pt was saying) pt stated: "I can't answer that".  "

## 2024-09-23 NOTE — TELEPHONE ENCOUNTER
Spoke with patient and regarding knee pain . Patient states that his wife just  his pain medication from the pharmacy. Patient said he is doing ok. Will call back if medication do not help with his knee pain.

## 2024-09-23 NOTE — TELEPHONE ENCOUNTER
Patient following up regarding pain medication that was supposed to be sent to the pharmacy last week. Advised the patient that tramadol was sent to the pharmacy on 9/20. Patient verbalizes understanding. Advised the patient to call back with any further questions or if symptoms worsen.      Reason for Disposition   Health information question, no triage required and triager able to answer question    Protocols used: Information Only Call - No Triage-A-

## 2024-09-24 ENCOUNTER — CLINICAL SUPPORT (OUTPATIENT)
Dept: REHABILITATION | Facility: HOSPITAL | Age: 64
End: 2024-09-24
Payer: OTHER GOVERNMENT

## 2024-09-24 DIAGNOSIS — Z74.09 IMPAIRED FUNCTIONAL MOBILITY, BALANCE, GAIT, AND ENDURANCE: Primary | ICD-10-CM

## 2024-09-24 DIAGNOSIS — M25.662 DECREASED RANGE OF MOTION OF LEFT KNEE: ICD-10-CM

## 2024-09-24 DIAGNOSIS — R29.898 DECREASED STRENGTH INVOLVING KNEE JOINT: ICD-10-CM

## 2024-09-24 PROCEDURE — 97140 MANUAL THERAPY 1/> REGIONS: CPT | Mod: PN

## 2024-09-24 PROCEDURE — 97110 THERAPEUTIC EXERCISES: CPT | Mod: PN

## 2024-09-24 PROCEDURE — 97116 GAIT TRAINING THERAPY: CPT | Mod: PN

## 2024-09-24 PROCEDURE — 97112 NEUROMUSCULAR REEDUCATION: CPT | Mod: PN

## 2024-09-25 NOTE — PROGRESS NOTES
[facility]   Physical Therapy Treatment Note      Name: Edmar Benitez  Clinic Number: [id number]    Therapy Diagnosis:   Encounter Diagnoses   Name Primary?    Impaired functional mobility, balance, gait, and endurance Yes    Decreased range of motion of left knee     Decreased strength involving knee joint        Physician: Obed Wiley MD    Visit Date: 9/24/2024    Physician Orders: PT Eval and Treat   Medical Diagnosis from Referral:   M17.12 (ICD-10-CM) - Primary osteoarthritis of left knee   M25.562,G89.29 (ICD-10-CM) - Chronic pain of left knee   M62.81 (ICD-10-CM) - Quadriceps weakness      Evaluation Date: 8/20/2024  Authorization Period Expiration: 12/31/2024  Plan of Care Expiration: 8/20/2024 to 10/11/2024  Visit # / Visits authorized: 12/14 (+eval)  FOTO: next PTA visits: 0/5     Time In: 9:00  Time Out: 10:00  Total Appointment Time (timed & untimed codes): 55 minutes (1 GT,  1 NM 1 TE, 1 MT)  Precautions: hyperglycemia, HTN     Subjective     Patient reports: continued high level knee pain. As a result, he states he 'can't' do any exercises at home. He is upset that he is not being given any additional pain medications to manage his pain.   He has not been compliant with home exercise program due to pain.   Response to previous treatment: feels better for awhile after he has been stretched but the next day he is more pain and he spends the whole day recovering.   Functional change: improved sitting tolerance    Pain: 8/10 walking; 5/10 at rest.  Location: left knee     Objective      Quad set: Good  Straight leg raise: 3-5 degree lag.   Effusion: 1+  Range of motion:   Passive extension:  (-) 4  degrees (walking into clinic 15 degrees)  Active assist (with strap) flexion:  130 degrees  Wound:  healing; no s/s of infection. Raised incision site over patella. Moderate effusion.   Quad set: Fair with hamstring co-activation.     Treatment     Edmar received the  treatments listed below:      Therapeutic exercises to develop strength, endurance, and ROM for 10 minutes including:  Stationary Bike, seat position: 18 at twin peaks Lv4.5 x 8'    Manual therapy techniques: total time 15 minutes, including:  Patellar mobs medial/lateral, superior/inferior grade II/III  Passive physiological knee extension grade II/III/IV     Neuromuscular re-education activities to improve: Sense, Proprioception, and Posture for  20 minutes. The following activities were included:  Self knee extension hinge mobs with strap 3x10  Standing TKEx 3x10 (moderate cueing)  Seated knee Cl extension self-mobs.     Gait training: total time 10 minutes:   Walking with mirror feedback in sagittal plane to promote terminal knee extension during Tst of gait.     Patient Education and Home Exercises       Education provided:   - discussed need to focus on extension home exercise program exercises.     Written Home Exercises Provided: Pt instructed to continue prior HEP. Exercises were reviewed and Edmar was able to demonstrate them prior to the end of the session.  Edmar demonstrated good  understanding of the education provided. See Electronic Medical Record under Patient Instructions for exercises provided during therapy sessions    Assessment   A: Edmar is a 64 y.o. male.  S/p left total knee arthroplasty. Sx date: 08/19/2024. Post 5 weeks (POD 36).  The beginning of patient's sessions continue to be lengthy discussions about pain management, need to participate in his home exercise program, and needing to achieve terminal knee extension during gait. His knee mostly continues to be painful because he is consistently walking around with 10-15 degrees of knee flexion.  Each session consists of working patient's knee back to (-) 5 degrees extension.  Patient is hyperfocused on pain but is able to tolerate all therapy activities without issue.     Edmar Is progressing well towards his goals.    Patient prognosis is Excellent.     Patient will continue to benefit from skilled outpatient physical therapy to address the deficits listed in the problem list box on initial evaluation, provide pt/family education and to maximize pt's level of independence in the home and community environment.   Patient's spiritual, cultural and educational needs considered and pt agreeable to plan of care and goals.     Anticipated barriers to physical therapy: none    Goals:   Short Term Goals: 3 weeks   1. Pt will be independent with HEP supplement PT in improving functional mobility.  Met  2. Pt will improve LLE strength to at least 60% of RLE strength as measured via MicroFet handheld dynamometer in order to improve functional mobility.  3. Pt will improve L knee AROM to at least (-) 5 - 100 in order to improve gait.     Long Term Goals: 6-8 weeks --> progressing towards  1. Pt will be independent with updated HEP supplement PT in improving functional mobility.  2. Pt will improve L LE strength to at least 70% of RLE strength as measured via MicroFet handheld dynamometer in order to improve functional mobility  3. Pt will improve L knee AROM to at least 0-120 in order to improve gait and ability to perform ADLs  4. Pt will improve FOTO knee survey score to </= 60% limited in order to demo improved functional mobility  5. Pt will improve KOOS Score by 9 points.   6. Pt will perform TUG in < 14 seconds without AD in order to demo improved gait speed  7. Pt will perform at least 5 sit to stands without UE support on 30 second sit to stand test in order to demo improved ability to perform transfers    Plan   Phase 1-2 post-op total knee arthroplasty.   Pain modulation  Swelling management  Progress range of motion     Bret Gu, PT, DPT, OCS  9/24/2024

## 2024-09-26 ENCOUNTER — CLINICAL SUPPORT (OUTPATIENT)
Dept: REHABILITATION | Facility: HOSPITAL | Age: 64
End: 2024-09-26
Payer: OTHER GOVERNMENT

## 2024-09-26 DIAGNOSIS — R29.898 DECREASED STRENGTH INVOLVING KNEE JOINT: ICD-10-CM

## 2024-09-26 DIAGNOSIS — M25.662 DECREASED RANGE OF MOTION OF LEFT KNEE: ICD-10-CM

## 2024-09-26 DIAGNOSIS — Z74.09 IMPAIRED FUNCTIONAL MOBILITY, BALANCE, GAIT, AND ENDURANCE: Primary | ICD-10-CM

## 2024-09-26 PROCEDURE — 97110 THERAPEUTIC EXERCISES: CPT | Mod: PN,CQ

## 2024-09-26 PROCEDURE — 97116 GAIT TRAINING THERAPY: CPT | Mod: PN,CQ

## 2024-09-26 PROCEDURE — 97140 MANUAL THERAPY 1/> REGIONS: CPT | Mod: PN,CQ

## 2024-09-26 PROCEDURE — 97112 NEUROMUSCULAR REEDUCATION: CPT | Mod: PN,CQ

## 2024-09-26 NOTE — PROGRESS NOTES
[facility]   Physical Therapy Treatment Note      Name: Edmar Benitez  Clinic Number: [id number]    Therapy Diagnosis:   Encounter Diagnoses   Name Primary?    Impaired functional mobility, balance, gait, and endurance Yes    Decreased range of motion of left knee     Decreased strength involving knee joint        Physician: Obed Wiley MD    Visit Date: 9/26/2024    Physician Orders: PT Eval and Treat   Medical Diagnosis from Referral:   M17.12 (ICD-10-CM) - Primary osteoarthritis of left knee   M25.562,G89.29 (ICD-10-CM) - Chronic pain of left knee   M62.81 (ICD-10-CM) - Quadriceps weakness      Evaluation Date: 8/20/2024  Authorization Period Expiration: 12/31/2024  Plan of Care Expiration: 8/20/2024 to 10/11/2024  Visit # / Visits authorized: 12/14 (+eval)  FOTO: next PTA visits: 1/5     Time In: 9:05  Time Out: 10:00  Total Appointment Time (timed & untimed codes): 55 minutes (1 GT,  1 NM 1 TE, 1 MT)  Precautions: hyperglycemia, HTN     Subjective     Patient reports: continues to express frustration over not having additional pain medications to manage his pain.   He has not been compliant with home exercise program due to pain.   Response to previous treatment: feels better for awhile after he has been stretched but the next day he is more pain and he spends the whole day recovering.   Functional change: improved sitting tolerance    Pain: 8/10 walking; 5/10 at rest.  Location: left knee     Objective      Quad set: Good  Straight leg raise: 3-5 degree lag.   Effusion: 1+  Range of motion:   Passive extension:  (-) 4  degrees (walking into clinic 15 degrees)  Active assist (with strap) flexion:  130 degrees  Wound:  healing; no s/s of infection. Raised incision site over patella. Moderate effusion.   Quad set: Fair with hamstring co-activation.     Treatment     Edmar received the treatments listed below:      Therapeutic exercises to develop strength, endurance, and ROM  for 10 minutes including:  Stationary Bike, seat position: 18 at twin peaks Lv4.5 x 8'    Manual therapy techniques: total time 15 minutes, including:  Patellar mobs medial/lateral, superior/inferior grade II/III  Passive physiological knee extension grade II/III/IV     Neuromuscular re-education activities to improve: Sense, Proprioception, and Posture for  20 minutes. The following activities were included:  Self knee extension hinge mobs with strap 3x10  Standing TKEx 3x10 (moderate cueing)  Seated knee Cl extension self-mobs.     Gait training: total time 10 minutes:   Walking with mirror feedback in sagittal plane to promote terminal knee extension during Tst of gait.     Patient Education and Home Exercises       Education provided:   - discussed need to focus on extension home exercise program exercises.     Written Home Exercises Provided: Pt instructed to continue prior HEP. Exercises were reviewed and Edmar was able to demonstrate them prior to the end of the session.  Edmar demonstrated good  understanding of the education provided. See Electronic Medical Record under Patient Instructions for exercises provided during therapy sessions    Assessment   A: Edmar is a 64 y.o. male.  S/p left total knee arthroplasty. Sx date: 08/19/2024. Post 5 weeks  + 3 days (POD 38).  Pain continues to be primary limiting factor/barrier to therapy. His knee mostly continues to be painful because he is consistently walking around with 10-15 degrees of knee flexion.  Continued to focus majority of session on working patient's knee extension. Frequent rest breaks for recovery. Patient would like to hold therapy due to pain. Evaluating PT will notify surgeon and await further advice.      Edmar Is progressing well towards his goals.   Patient prognosis is Excellent.     Patient will continue to benefit from skilled outpatient physical therapy to address the deficits listed in the problem list box on  initial evaluation, provide pt/family education and to maximize pt's level of independence in the home and community environment.   Patient's spiritual, cultural and educational needs considered and pt agreeable to plan of care and goals.     Anticipated barriers to physical therapy: none    Goals:   Short Term Goals: 3 weeks   1. Pt will be independent with HEP supplement PT in improving functional mobility.  Met  2. Pt will improve LLE strength to at least 60% of RLE strength as measured via MicroFet handheld dynamometer in order to improve functional mobility.  3. Pt will improve L knee AROM to at least (-) 5 - 100 in order to improve gait.     Long Term Goals: 6-8 weeks --> progressing towards  1. Pt will be independent with updated HEP supplement PT in improving functional mobility.  2. Pt will improve L LE strength to at least 70% of RLE strength as measured via MicroFet handheld dynamometer in order to improve functional mobility  3. Pt will improve L knee AROM to at least 0-120 in order to improve gait and ability to perform ADLs  4. Pt will improve FOTO knee survey score to </= 60% limited in order to demo improved functional mobility  5. Pt will improve KOOS Score by 9 points.   6. Pt will perform TUG in < 14 seconds without AD in order to demo improved gait speed  7. Pt will perform at least 5 sit to stands without UE support on 30 second sit to stand test in order to demo improved ability to perform transfers    Plan   Phase 1-2 post-op total knee arthroplasty.   Pain modulation  Swelling management  Progress range of motion     Tina Ortiz, PTA  9/26/2024

## 2024-09-27 ENCOUNTER — TELEPHONE (OUTPATIENT)
Dept: ORTHOPEDICS | Facility: CLINIC | Age: 64
End: 2024-09-27
Payer: OTHER GOVERNMENT

## 2024-09-27 ENCOUNTER — NURSE TRIAGE (OUTPATIENT)
Dept: ADMINISTRATIVE | Facility: CLINIC | Age: 64
End: 2024-09-27
Payer: OTHER GOVERNMENT

## 2024-09-27 NOTE — TELEPHONE ENCOUNTER
Spoke with patient regarding knee pain. Patient states that he knee has a constant pain and he can not do anything. Pain medication is not working and would like something stronger. I offer patient to see Dr. Wiley on Tuesday at 130. Patient verbally agreed to that appointment with Dr. Wiley.

## 2024-09-27 NOTE — TELEPHONE ENCOUNTER
Left voice mail for patient 2 x's to call the office back regarding his knee pain. Advised patient on the message that we can see him on Monday or Tuesday. Waiting for patient to call the office back with his discission.

## 2024-09-27 NOTE — TELEPHONE ENCOUNTER
"----- Message from Faiza Hernandez sent at 9/27/2024 11:34 AM CDT -----  Pt Note to Doctor    Who called: pt  Best call back #:  [phone]  Note:  pt said he was having severe knee pain; I got pt over to on call nurse 2-3 times as that was what symptom screening instructed; when pt called back I offered to schedule him with EVELIN Chu on 9/30/24, pt declined and ended call on me; I called pt back and offered to schedule him with Dr. Wiley on 10/1/24 morning (as JALIL Humphrey told me availability) and pt declined appt saying "[facility]; pt ended call on me again  "

## 2024-09-27 NOTE — TELEPHONE ENCOUNTER
"----- Message from Faiza Hernandez sent at 9/27/2024 11:34 AM CDT -----  Pt Note to Doctor    Who called: pt  Best call back #:  [phone]  Note:  pt said he was having severe knee pain; I got pt over to on call nurse 2-3 times as that was what symptom screening instructed; when pt called back I offered to schedule him with EVELIN Chu on 9/30/24, pt declined and ended call on me; I called pt back and offered to schedule him with Dr. Wiley on 10/1/24 morning (as JALIL Humphrey told me availability) and pt declined appt saying "[facility]; pt ended call on me again  "

## 2024-09-27 NOTE — TELEPHONE ENCOUNTER
Pt post left knee surgery 8/19/24. States that left knee is 10/10 pain without relief with Tramadol as ordered. Advised to receive callback within one hour per protocol. VU. Encounter routed to provider for f/u call.     Reason for Disposition   SEVERE post-op pain (e.g., excruciating, pain scale 8-10) that is not controlled with pain medications    Additional Information   Negative: Sounds like a life-threatening emergency to the triager   Negative: Bright red, wide-spread, sunburn-like rash   Negative: SEVERE headache and after spinal (epidural) anesthesia   Negative: Vomiting and persists > 4 hours   Negative: Vomiting and abdomen looks much more swollen than usual   Negative: Drinking very little and dehydration suspected (e.g., no urine > 12 hours, very dry mouth, very lightheaded)   Negative: Patient sounds very sick or weak to the triager   Negative: Sounds like a serious complication to the triager   Negative: Fever > 100.4 F (38.0 C)   Negative: Caller has URGENT question and triager unable to answer question    Protocols used: Post-Op Symptoms and Pprdcmsey-J-OT

## 2024-09-28 RX ORDER — HYDROCODONE BITARTRATE AND ACETAMINOPHEN 5; 325 MG/1; MG/1
1 TABLET ORAL EVERY 12 HOURS PRN
Qty: 14 TABLET | Refills: 0 | Status: SHIPPED | OUTPATIENT
Start: 2024-09-28 | End: 2024-10-05

## 2024-10-01 ENCOUNTER — RESEARCH ENCOUNTER (OUTPATIENT)
Dept: RESEARCH | Facility: HOSPITAL | Age: 64
End: 2024-10-01
Payer: OTHER GOVERNMENT

## 2024-10-01 ENCOUNTER — OFFICE VISIT (OUTPATIENT)
Dept: ORTHOPEDICS | Facility: CLINIC | Age: 64
End: 2024-10-01
Payer: OTHER GOVERNMENT

## 2024-10-01 ENCOUNTER — TELEPHONE (OUTPATIENT)
Dept: ORTHOPEDICS | Facility: CLINIC | Age: 64
End: 2024-10-01

## 2024-10-01 VITALS — HEIGHT: 76 IN | WEIGHT: 233.94 LBS | BODY MASS INDEX: 28.49 KG/M2

## 2024-10-01 DIAGNOSIS — M62.81 QUADRICEPS WEAKNESS: Primary | ICD-10-CM

## 2024-10-01 DIAGNOSIS — Z96.652 AFTERCARE FOLLOWING LEFT KNEE JOINT REPLACEMENT SURGERY: ICD-10-CM

## 2024-10-01 DIAGNOSIS — Z47.1 AFTERCARE FOLLOWING LEFT KNEE JOINT REPLACEMENT SURGERY: ICD-10-CM

## 2024-10-01 PROCEDURE — 99214 OFFICE O/P EST MOD 30 MIN: CPT | Mod: PBBFAC,PN | Performed by: ORTHOPAEDIC SURGERY

## 2024-10-01 PROCEDURE — 99999 PR PBB SHADOW E&M-EST. PATIENT-LVL IV: CPT | Mod: PBBFAC,,, | Performed by: ORTHOPAEDIC SURGERY

## 2024-10-01 PROCEDURE — 99024 POSTOP FOLLOW-UP VISIT: CPT | Mod: ,,, | Performed by: ORTHOPAEDIC SURGERY

## 2024-10-01 RX ORDER — HYDROCODONE BITARTRATE AND ACETAMINOPHEN 7.5; 325 MG/1; MG/1
1 TABLET ORAL EVERY 12 HOURS PRN
Qty: 14 TABLET | Refills: 0 | Status: SHIPPED | OUTPATIENT
Start: 2024-10-01 | End: 2024-10-08

## 2024-10-01 NOTE — PROGRESS NOTES
[facility] [address]          Subjective:     Patient ID: Edmar Benitez is a 64 y.o. male.    Chief Complaint: Pain and Post-op Evaluation of the Left Knee    Patient is 6 weeks s/p  left primary total knee replacement  Anterior knee pain: Yes, reports quad spasms  Has stable pain  Is in physical therapy  No, reports finishing therapy about a week ago and has not been doing exercises at home due to pain  Problems w incision  No  Is  happy with result  No  Opiod free: No     Patient has been called in clinic due to ongoing pain and was given tramadol prescription on 09/20 which she reports did not help with his pain.  Patient had Norco 5 called in 3 days ago but reports he has not filled it because he did not know that it got called in.  Denies fevers chills, or significant swelling and erythema to knee.      History reviewed. No pertinent past medical history.     Past Surgical History:   Procedure Laterality Date    TOTAL KNEE ARTHROPLASTY Left 8/19/2024    Procedure: ARTHROPLASTY, KNEE, TOTAL monoblock;  Surgeon: Obed Wiley MD;  Location: [facility];  Service: Orthopedics;  Laterality: Left;  bmi - 28.47        Current Outpatient Medications   Medication Instructions    amLODIPine (NORVASC) 10 mg    aspirin (ECOTRIN) 81 mg, Oral, 2 times daily    brimonidine (LUMIFY) 0.025 % Drop 1 drop, Ophthalmic, Every 8 hours PRN    buPROPion (WELLBUTRIN XL) 150 MG TB24 tablet 1 tablet, Daily    calcium citrate-vitamin D3 315-200 mg (CITRACAL+D) 315 mg-5 mcg (200 unit) per tablet 1 tablet, 2 times daily    diclofenac (VOLTAREN) 75 mg, Oral, 2 times daily    diclofenac sodium (VOLTAREN) 1 % Gel Apply topically.    empagliflozin (JARDIANCE) 25 mg tablet 1 tablet, Every morning    ergocalciferol (ERGOCALCIFEROL) 50,000 unit Cap     ezetimibe (ZETIA) 10 mg    famotidine (PEPCID) 20 mg, Oral, 2 times daily    fluticasone propionate (FLONASE) 50 mcg/actuation nasal spray INSTILL 1 SPRAY IN EACH NOSTRIL EVERY DAY AS NEEDED  FOR ALLERGIES    gabapentin (NEURONTIN) 600 mg, Oral, 2 times daily    HYDROcodone-acetaminophen (NORCO) 5-325 mg per tablet 1 tablet, Oral, Every 12 hours PRN    Lactobacillus acidophilus Chew Take by mouth.    loratadine (CLARITIN) 10 mg    meloxicam (MOBIC) 7.5 mg, Oral, 2 times daily PRN    metFORMIN (GLUCOPHAGE) 1,000 mg    tamsulosin (FLOMAX) 0.4 mg    traMADoL (ULTRAM) 50 mg, Oral, Every 12 hours PRN    traZODone (DESYREL) 100 mg        Review of patient's allergies indicates:   Allergen Reactions    Milk     Acetaminophen Nausea And Vomiting       Social History     Socioeconomic History    Marital status:    Tobacco Use    Smoking status: Unknown   Substance and Sexual Activity    Alcohol use: Never    Drug use: Never       No family history on file.      Review of systems negative except noted in HPI    Objective:   Physical Exam:     left knee  Surgical incision well healed, no signs of infection  Mild knee effusion  TTP diffusely about anterior knee, over medial and lateral joint lines and patella, greatest over quad tendon  ROM   4/5 quad strength with visible quad atrophy  Stable anterior/posterior   Stable varus/valgus  Grossly NVI distally      Assessment:      Edmar Benitez is a 64 y.o. male s/p left TKA 08/19/2024, with ongoing pain secondary to quad weakness and deconditioning    Plan :  -reassured and explained to patient that his pain is coming from weakness and deconditioning leading to instability and increased knee pain, stressed importance of continuing physical therapy strengthening exercises at home daily  -confirmed Norco 5 prescription was received by pharmacy, patient will call and get it filled  -follow up as scheduled in 6 weeks with xrays.       Glenroy Gonzales MD  [employer] [facility] PGY-3

## 2024-10-01 NOTE — TELEPHONE ENCOUNTER
----- Message from Faiza sent at 10/1/2024  2:44 PM CDT -----  Type:  RX Refill Request    Who Called: pt  Refill or New Rx: new  RX Name and Strength: HYDROcodone-acetaminophen (NORCO) (pt said he needs a different strength that pharmacy have already)    Preferred Pharmacy:    Ordering Provider:  Reach pt via:  Best Call Back Number:  [phone]  Additional Information: pt is requesting this because he said the initial 5-325mg strength for this med is on back order

## 2024-10-01 NOTE — PROGRESS NOTES
Protocol: Innovations in Genicular Outcomes Registry (Deidre)  Protocol#:Deidre  IRB# 2021.156  Version Date: 10-Adiel-2023  PI: Obed Wiley MD  Patient Initials: KINGSLEYSHEYLA (Study ID# [id number])      Clinic Visit     Patient was met in clinic by research personnel and orthopedics provider. Patient previously had TKA to target knee (left). No injections or other interventions were given at this appointment. Patient's pain score was a 10. Patient reported to taking Tramadol and Gabapentin  for knee pain.    He has completed his physical therapy schedule but still reports pain in knees post-tka. He had not been doing exercises at home due to pain. NORCO 5 was no received by patient. Dr. Wiley has ordered new round of physical therapy for quad strength and NORCO 5 for patient.      Patient instructed to continue study questionnaires and to call research personnel if they have any questions. Currently in the monthly phase of study.

## 2024-10-02 ENCOUNTER — NURSE TRIAGE (OUTPATIENT)
Dept: ADMINISTRATIVE | Facility: CLINIC | Age: 64
End: 2024-10-02
Payer: OTHER GOVERNMENT

## 2024-10-02 ENCOUNTER — TELEPHONE (OUTPATIENT)
Dept: ORTHOPEDICS | Facility: CLINIC | Age: 64
End: 2024-10-02
Payer: OTHER GOVERNMENT

## 2024-10-02 NOTE — TELEPHONE ENCOUNTER
----- Message from Shadi sent at 10/2/2024 10:07 AM CDT -----  Type: General Call Back     Name of Caller:pt   Reason patient pharmacy is out of the HYDROcodone-acetaminophen (NORCO) 7.5-325 mg per tablet . The patient states that his pharmacy does have HYDROcodone-acetaminophen (NORCO) 10 -325. Please give the patient can call back to advise     [facility]/pharmacy #[id number] - [place], [place] - [address] [facility]   Phone: [phone]    Would the patient rather a call back or a response via [facility]? Call   Best Call Back Number: [phone]  Additional Information:

## 2024-10-02 NOTE — TELEPHONE ENCOUNTER
Pt states he is waiting for prescription for pain med and pharmacy is out of stock. Contacted pharmacy and confirmed prescription is ready for . Updated pt medication ready for .   Reason for Disposition   General information question, no triage required and triager able to answer question    Protocols used: Information Only Call - No Triage-A-

## 2024-10-02 NOTE — TELEPHONE ENCOUNTER
Pt states he is waiting for prescription for pain med and pharmacy is out of stock. Contacted pharmacy and confirmed prescription is ready for . Updated pt medication ready for .

## 2024-10-08 ENCOUNTER — CLINICAL SUPPORT (OUTPATIENT)
Dept: REHABILITATION | Facility: HOSPITAL | Age: 64
End: 2024-10-08
Attending: ORTHOPAEDIC SURGERY
Payer: OTHER GOVERNMENT

## 2024-10-08 DIAGNOSIS — Z47.1 AFTERCARE FOLLOWING LEFT KNEE JOINT REPLACEMENT SURGERY: ICD-10-CM

## 2024-10-08 DIAGNOSIS — Z96.652 AFTERCARE FOLLOWING LEFT KNEE JOINT REPLACEMENT SURGERY: ICD-10-CM

## 2024-10-08 DIAGNOSIS — R29.898 DECREASED STRENGTH INVOLVING KNEE JOINT: ICD-10-CM

## 2024-10-08 DIAGNOSIS — M62.81 QUADRICEPS WEAKNESS: ICD-10-CM

## 2024-10-08 DIAGNOSIS — M25.662 DECREASED RANGE OF MOTION OF LEFT KNEE: ICD-10-CM

## 2024-10-08 DIAGNOSIS — Z74.09 IMPAIRED FUNCTIONAL MOBILITY, BALANCE, GAIT, AND ENDURANCE: Primary | ICD-10-CM

## 2024-10-08 PROCEDURE — 97112 NEUROMUSCULAR REEDUCATION: CPT | Mod: PN,CQ

## 2024-10-08 PROCEDURE — 97110 THERAPEUTIC EXERCISES: CPT | Mod: PN,CQ

## 2024-10-08 PROCEDURE — 97140 MANUAL THERAPY 1/> REGIONS: CPT | Mod: PN,CQ

## 2024-10-08 NOTE — PROGRESS NOTES
[facility]   Physical Therapy Treatment Note      Name: Edmar Benitez  Clinic Number: [id number]    Therapy Diagnosis:   Encounter Diagnoses   Name Primary?    Quadriceps weakness     Aftercare following left knee joint replacement surgery     Impaired functional mobility, balance, gait, and endurance Yes    Decreased range of motion of left knee     Decreased strength involving knee joint        Physician: Obed Wiley MD    Visit Date: 10/8/2024    Physician Orders: PT Eval and Treat   Medical Diagnosis from Referral:   M17.12 (ICD-10-CM) - Primary osteoarthritis of left knee   M25.562,G89.29 (ICD-10-CM) - Chronic pain of left knee   M62.81 (ICD-10-CM) - Quadriceps weakness      Evaluation Date: 8/20/2024  Authorization Period Expiration: 12/31/2024  Plan of Care Expiration: 8/20/2024 to 10/11/2024  Visit # / Visits authorized: 1/1 (+eval)  FOTO: next PTA visits: 2/5     Time In: 9:05  Time Out: 10:00  Total Appointment Time (timed & untimed codes): 55 minutes (2 NM 1 TE, 1 MT)  Precautions: hyperglycemia, HTN     Subjective     Patient reports: Leg has been feeling better, uses topical spray to manage pain symptoms. Overall decreased pain with walking and at rest. Still having significant stiffness in knee primarily in the morning.   He has not been compliant with home exercise program due to pain.   Response to previous treatment: Felt fine  Functional change: improved sitting tolerance    Pain: 5/10 walking; 3/10 at rest.  Location: left knee     Objective      Quad set: Good  Straight leg raise: 3-5 degree lag.   Effusion: 1+  Range of motion:   Passive extension:  (-) 4  degrees (walking into clinic 10 degrees)  Active assist (with strap) flexion:  130 degrees  Wound:  healing; no s/s of infection. Raised incision site over patella. Moderate effusion.   Quad set: Fair with hamstring co-activation.     Treatment     Edmar received the treatments listed below:      Therapeutic  "exercises to develop strength, endurance, and ROM for 10 minutes including:  Stationary Bike, seat position: 18 at twin peaks Lv4.5 x 8'    Manual therapy techniques: total time 15 minutes, including:  Patellar mobs medial/lateral, superior/inferior grade II/III  Passive physiological knee extension grade II/III/IV     Neuromuscular re-education activities to improve: Sense, Proprioception, and Posture for  30 minutes. The following activities were included:  Quad set 5" x 20 with rolled towel  Self knee extension hinge mobs with strap 3x10  Standing TKEx 3x10 (moderate cueing)  Seated knee Cl extension self-mobs.     Gait training: total time 00 minutes:   Walking with mirror feedback in sagittal plane to promote terminal knee extension during Tst of gait.     Patient Education and Home Exercises       Education provided:   - discussed need to focus on extension home exercise program exercises.     Written Home Exercises Provided: Pt instructed to continue prior HEP. Exercises were reviewed and Edmar was able to demonstrate them prior to the end of the session.  Edmar demonstrated good  understanding of the education provided. See Electronic Medical Record under Patient Instructions for exercises provided during therapy sessions    Assessment   A: Edmar is a 64 y.o. male.  S/p left total knee arthroplasty. Sx date: 08/19/2024. Post 7 weeks  + 1 days (POD 50).  Patient returns to PT following ~2 week gap in session. Pain better controlled since last visit. Has not taken pain medication past 2 days. Knee extension showing some improvement actively and about the same passively. Good carry over intra-session. Encouraged compliance with HEP for better carry over inter-session. Monitor response. Progress as appropriate.      Edmar Is progressing well towards his goals.   Patient prognosis is Excellent.     Patient will continue to benefit from skilled outpatient physical therapy to address the " deficits listed in the problem list box on initial evaluation, provide pt/family education and to maximize pt's level of independence in the home and community environment.   Patient's spiritual, cultural and educational needs considered and pt agreeable to plan of care and goals.     Anticipated barriers to physical therapy: none    Goals:   Short Term Goals: 3 weeks   1. Pt will be independent with HEP supplement PT in improving functional mobility.  Met  2. Pt will improve LLE strength to at least 60% of RLE strength as measured via MicroFet handheld dynamometer in order to improve functional mobility.  3. Pt will improve L knee AROM to at least (-) 5 - 100 in order to improve gait.     Long Term Goals: 6-8 weeks --> progressing towards  1. Pt will be independent with updated HEP supplement PT in improving functional mobility.  2. Pt will improve L LE strength to at least 70% of RLE strength as measured via MicroFet handheld dynamometer in order to improve functional mobility  3. Pt will improve L knee AROM to at least 0-120 in order to improve gait and ability to perform ADLs  4. Pt will improve FOTO knee survey score to </= 60% limited in order to demo improved functional mobility  5. Pt will improve KOOS Score by 9 points.   6. Pt will perform TUG in < 14 seconds without AD in order to demo improved gait speed  7. Pt will perform at least 5 sit to stands without UE support on 30 second sit to stand test in order to demo improved ability to perform transfers    Plan   Phase 1-2 post-op total knee arthroplasty.   Pain modulation  Swelling management  Progress range of motion     Tina Ortiz, PTA  10/8/2024

## 2024-10-10 ENCOUNTER — TELEPHONE (OUTPATIENT)
Dept: ORTHOPEDICS | Facility: CLINIC | Age: 64
End: 2024-10-10
Payer: OTHER GOVERNMENT

## 2024-10-10 RX ORDER — HYDROCODONE BITARTRATE AND ACETAMINOPHEN 7.5; 325 MG/1; MG/1
1 TABLET ORAL EVERY 12 HOURS PRN
Qty: 28 TABLET | Refills: 0 | Status: SHIPPED | OUTPATIENT
Start: 2024-10-10 | End: 2024-10-24

## 2024-10-10 NOTE — TELEPHONE ENCOUNTER
----- Message from Faiza sent at 10/10/2024  1:17 PM CDT -----  Type:  RX Refill Request    Who Called: pt  Refill or New Rx: refill  RX Name and Strength: oxycodone 7.5mg  ( pt said)    Preferred Pharmacy:    Ordering Provider:  Reach pt via:  Best Call Back Number:  [phone]  Additional Information:

## 2024-10-16 ENCOUNTER — PATIENT MESSAGE (OUTPATIENT)
Dept: RESEARCH | Facility: HOSPITAL | Age: 64
End: 2024-10-16
Payer: OTHER GOVERNMENT

## 2024-10-28 ENCOUNTER — TELEPHONE (OUTPATIENT)
Dept: ORTHOPEDICS | Facility: CLINIC | Age: 64
End: 2024-10-28
Payer: OTHER GOVERNMENT

## 2024-10-29 ENCOUNTER — RESEARCH ENCOUNTER (OUTPATIENT)
Dept: RESEARCH | Facility: HOSPITAL | Age: 64
End: 2024-10-29
Payer: OTHER GOVERNMENT

## 2024-10-29 ENCOUNTER — LAB VISIT (OUTPATIENT)
Dept: LAB | Facility: HOSPITAL | Age: 64
End: 2024-10-29
Attending: ORTHOPAEDIC SURGERY
Payer: OTHER GOVERNMENT

## 2024-10-29 ENCOUNTER — OFFICE VISIT (OUTPATIENT)
Dept: ORTHOPEDICS | Facility: CLINIC | Age: 64
End: 2024-10-29
Payer: OTHER GOVERNMENT

## 2024-10-29 VITALS — HEIGHT: 76 IN | WEIGHT: 233.94 LBS | BODY MASS INDEX: 28.49 KG/M2

## 2024-10-29 DIAGNOSIS — Z47.1 AFTERCARE FOLLOWING LEFT KNEE JOINT REPLACEMENT SURGERY: ICD-10-CM

## 2024-10-29 DIAGNOSIS — Z96.652 AFTERCARE FOLLOWING LEFT KNEE JOINT REPLACEMENT SURGERY: ICD-10-CM

## 2024-10-29 DIAGNOSIS — Z96.652 AFTERCARE FOLLOWING LEFT KNEE JOINT REPLACEMENT SURGERY: Primary | ICD-10-CM

## 2024-10-29 DIAGNOSIS — Z47.1 AFTERCARE FOLLOWING LEFT KNEE JOINT REPLACEMENT SURGERY: Primary | ICD-10-CM

## 2024-10-29 LAB
CRP SERPL-MCNC: 0.4 MG/L (ref 0–8.2)
ERYTHROCYTE [SEDIMENTATION RATE] IN BLOOD BY PHOTOMETRIC METHOD: 3 MM/HR (ref 0–23)

## 2024-10-29 PROCEDURE — 99024 POSTOP FOLLOW-UP VISIT: CPT | Mod: ,,, | Performed by: ORTHOPAEDIC SURGERY

## 2024-10-29 PROCEDURE — 85652 RBC SED RATE AUTOMATED: CPT | Performed by: ORTHOPAEDIC SURGERY

## 2024-10-29 PROCEDURE — 86140 C-REACTIVE PROTEIN: CPT | Performed by: ORTHOPAEDIC SURGERY

## 2024-10-29 PROCEDURE — 99999 PR PBB SHADOW E&M-EST. PATIENT-LVL IV: CPT | Mod: PBBFAC,,, | Performed by: ORTHOPAEDIC SURGERY

## 2024-10-29 PROCEDURE — 99214 OFFICE O/P EST MOD 30 MIN: CPT | Mod: PBBFAC,PN | Performed by: ORTHOPAEDIC SURGERY

## 2024-10-29 PROCEDURE — 36415 COLL VENOUS BLD VENIPUNCTURE: CPT | Performed by: ORTHOPAEDIC SURGERY

## 2024-10-29 RX ORDER — CYCLOBENZAPRINE HCL 5 MG
5 TABLET ORAL NIGHTLY
Qty: 14 TABLET | Refills: 0 | Status: SHIPPED | OUTPATIENT
Start: 2024-10-29 | End: 2024-11-12

## 2024-10-29 RX ORDER — HYDROCODONE BITARTRATE AND ACETAMINOPHEN 5; 325 MG/1; MG/1
1 TABLET ORAL EVERY 12 HOURS PRN
Qty: 28 TABLET | Refills: 0 | Status: SHIPPED | OUTPATIENT
Start: 2024-10-29 | End: 2024-11-12

## 2024-11-19 ENCOUNTER — RESEARCH ENCOUNTER (OUTPATIENT)
Dept: RESEARCH | Facility: HOSPITAL | Age: 64
End: 2024-11-19
Payer: OTHER GOVERNMENT

## 2024-11-19 ENCOUNTER — HOSPITAL ENCOUNTER (OUTPATIENT)
Dept: RADIOLOGY | Facility: HOSPITAL | Age: 64
Discharge: HOME | End: 2024-11-19
Attending: ORTHOPAEDIC SURGERY
Payer: OTHER GOVERNMENT

## 2024-11-19 ENCOUNTER — OFFICE VISIT (OUTPATIENT)
Dept: ORTHOPEDICS | Facility: CLINIC | Age: 64
End: 2024-11-19
Payer: OTHER GOVERNMENT

## 2024-11-19 VITALS — HEIGHT: 76 IN | BODY MASS INDEX: 28.49 KG/M2 | WEIGHT: 233.94 LBS

## 2024-11-19 DIAGNOSIS — Z47.1 AFTERCARE FOLLOWING LEFT KNEE JOINT REPLACEMENT SURGERY: ICD-10-CM

## 2024-11-19 DIAGNOSIS — Z47.1 AFTERCARE FOLLOWING LEFT KNEE JOINT REPLACEMENT SURGERY: Primary | ICD-10-CM

## 2024-11-19 DIAGNOSIS — Z96.652 AFTERCARE FOLLOWING LEFT KNEE JOINT REPLACEMENT SURGERY: ICD-10-CM

## 2024-11-19 DIAGNOSIS — Z96.652 AFTERCARE FOLLOWING LEFT KNEE JOINT REPLACEMENT SURGERY: Primary | ICD-10-CM

## 2024-11-19 PROCEDURE — 99214 OFFICE O/P EST MOD 30 MIN: CPT | Mod: S$PBB,,, | Performed by: ORTHOPAEDIC SURGERY

## 2024-11-19 PROCEDURE — 99999 PR PBB SHADOW E&M-EST. PATIENT-LVL IV: CPT | Mod: PBBFAC,,, | Performed by: ORTHOPAEDIC SURGERY

## 2024-11-19 PROCEDURE — 77073 BONE LENGTH STUDIES: CPT | Mod: 26,,, | Performed by: RADIOLOGY

## 2024-11-19 PROCEDURE — 77073 BONE LENGTH STUDIES: CPT | Mod: TC,PN

## 2024-11-19 PROCEDURE — 99214 OFFICE O/P EST MOD 30 MIN: CPT | Mod: PBBFAC,25,PN | Performed by: ORTHOPAEDIC SURGERY

## 2024-11-19 PROCEDURE — 73562 X-RAY EXAM OF KNEE 3: CPT | Mod: 26,LT,, | Performed by: RADIOLOGY

## 2024-11-19 PROCEDURE — G2211 COMPLEX E/M VISIT ADD ON: HCPCS | Mod: S$PBB,,, | Performed by: ORTHOPAEDIC SURGERY

## 2024-11-19 PROCEDURE — 73562 X-RAY EXAM OF KNEE 3: CPT | Mod: TC,PN,LT

## 2024-11-19 RX ORDER — DICLOFENAC SODIUM 75 MG/1
75 TABLET, DELAYED RELEASE ORAL 2 TIMES DAILY
Qty: 28 TABLET | Refills: 0 | Status: SHIPPED | OUTPATIENT
Start: 2024-11-19 | End: 2024-12-03

## 2024-11-19 NOTE — PROGRESS NOTES
[facility] [address]          Subjective:     Patient ID: Edmar Benitez is a 64 y.o. male.    Chief Complaint: Pain and Post-op Evaluation of the Left Knee    Patient is 12 weeks s/p  left primary total knee replacement  Anterior knee pain: Yes  Has stable pain  Is in physical therapy : No, he reports therapy is too far and is not interested any formal therapy stating that he can do exercises at home on his own  Problems w incision  No  Is  happy with result  yes  Opiod free: No, currently on Norco daily    Reports pain is improving overall compared to when seen 3 weeks ago  ESR and CRP obtained at last visit both normal      History reviewed. No pertinent past medical history.     Past Surgical History:   Procedure Laterality Date    TOTAL KNEE ARTHROPLASTY Left 8/19/2024    Procedure: ARTHROPLASTY, KNEE, TOTAL monoblock;  Surgeon: Obed Wiley MD;  Location: [facility];  Service: Orthopedics;  Laterality: Left;  bmi - 28.47        Current Outpatient Medications   Medication Instructions    amLODIPine (NORVASC) 10 mg    aspirin (ECOTRIN) 81 mg, Oral, 2 times daily    brimonidine (LUMIFY) 0.025 % Drop 1 drop, Ophthalmic, Every 8 hours PRN    buPROPion (WELLBUTRIN XL) 150 MG TB24 tablet 1 tablet, Daily    calcium citrate-vitamin D3 315-200 mg (CITRACAL+D) 315 mg-5 mcg (200 unit) per tablet 1 tablet, 2 times daily    diclofenac (VOLTAREN) 75 mg, Oral, 2 times daily    diclofenac sodium (VOLTAREN) 1 % Gel Apply topically.    empagliflozin (JARDIANCE) 25 mg tablet 1 tablet, Every morning    ergocalciferol (ERGOCALCIFEROL) 50,000 unit Cap     ezetimibe (ZETIA) 10 mg    famotidine (PEPCID) 20 mg, Oral, 2 times daily    fluticasone propionate (FLONASE) 50 mcg/actuation nasal spray INSTILL 1 SPRAY IN EACH NOSTRIL EVERY DAY AS NEEDED FOR ALLERGIES    gabapentin (NEURONTIN) 600 mg, Oral, 2 times daily    Lactobacillus acidophilus Chew Take by mouth.    loratadine (CLARITIN) 10 mg    meloxicam (MOBIC) 7.5 mg, Oral, 2  times daily PRN    metFORMIN (GLUCOPHAGE) 1,000 mg    tamsulosin (FLOMAX) 0.4 mg    traZODone (DESYREL) 100 mg        Review of patient's allergies indicates:   Allergen Reactions    Milk     Acetaminophen Nausea And Vomiting       Social History     Socioeconomic History    Marital status:    Tobacco Use    Smoking status: Unknown   Substance and Sexual Activity    Alcohol use: Never    Drug use: Never       No family history on file.      Review of systems negative except for noted in HPI    Objective:   Physical Exam:     left knee  Skin atraumatic   mild effusion  Mild tenderness over lateral knee  ROM 7-110  4+ out of 5 quad strength, appears to have better muscle mass compared to when seen 3 weeks ago  Stable anterior/posterior   Stable varus/valgus  No groin pain with rotation of hip  Grossly NVI distally    Imaging:   X-rays obtained this clinic visit reviewed. Components appropriately placed. No evidence of hardware complication.      Assessment:        Edmar Benitez is a 64 y.o. male    Encounter Diagnosis   Name Primary?    Aftercare following left knee joint replacement surgery Yes       Plan :  -patient appears to be doing better compared to last seen 3 weeks ago with the pain improving  -again emphasized importance of continue to work on therapy exercises at home and working on regaining full knee extension; range of motion and strength appear somewhat improved compared to last visit  -refilled Voltaren prescription  They can continue activities as tolerated avoiding high impact activities.  I would like to see the patient back In 3 months with xrays.         Orders Placed This Encounter   Procedures    X-Ray Hip to Ankle    X-Ray Knee 3 View Left          Glenroy Gonzales MD  [employer] PGY-3

## 2024-11-19 NOTE — PROGRESS NOTES
Protocol: Innovations in Genicular Outcomes Registry (Deidre)  Protocol#:Deidre  IRB# 2021.156  Version Date: 10-Adiel-2023  PI: Obed Wiley MD  Patient Initials: ADAIRCHARLYJeffery (Study ID# [id number])      Clinic Visit     Patient was met in clinic by research personnel and orthopedics provider. Patient previously had TKA to target knee (left). No injections or other interventions were given at this appointment. Patient's pain score was a 6. Patient reports taking diclofenac for pain as needed. He is doing well and has been continues home exercises.He will come back for a 3 month follow up.      Patient instructed to continue study questionnaires and to call research personnel if they have any questions. Currently in monthly phase of study.

## 2024-11-27 ENCOUNTER — PATIENT MESSAGE (OUTPATIENT)
Dept: RESEARCH | Facility: HOSPITAL | Age: 64
End: 2024-11-27
Payer: OTHER GOVERNMENT

## 2025-01-31 DIAGNOSIS — Z96.652 AFTERCARE FOLLOWING LEFT KNEE JOINT REPLACEMENT SURGERY: Primary | ICD-10-CM

## 2025-01-31 DIAGNOSIS — Z47.1 AFTERCARE FOLLOWING LEFT KNEE JOINT REPLACEMENT SURGERY: Primary | ICD-10-CM

## 2025-02-13 ENCOUNTER — TELEPHONE (OUTPATIENT)
Dept: ORTHOPEDICS | Facility: CLINIC | Age: 65
End: 2025-02-13
Payer: OTHER GOVERNMENT

## 2025-02-13 RX ORDER — DICLOFENAC SODIUM 75 MG/1
75 TABLET, DELAYED RELEASE ORAL 2 TIMES DAILY
Qty: 28 TABLET | Refills: 0 | Status: SHIPPED | OUTPATIENT
Start: 2025-02-13 | End: 2025-02-27

## 2025-02-13 NOTE — TELEPHONE ENCOUNTER
----- Message from Ai sent at 2/13/2025  1:05 PM CST -----  Type:  RX Refill Request    Who Called: Pt  Refill or New Rx:refill not on file  diclofenac (VOLTAREN) 75 mg, Oral, 2 times daily  Preferred Pharmacy with phone number:  [facility]/pharmacy #[id number] - [place] - [address] [facility]  [address] [facility] [place] [address]  Phone: [phone] Fax: [phone]  Best Call Back Number:[phone]

## 2025-02-20 DIAGNOSIS — Z96.652 AFTERCARE FOLLOWING LEFT KNEE JOINT REPLACEMENT SURGERY: Primary | ICD-10-CM

## 2025-02-20 DIAGNOSIS — Z47.1 AFTERCARE FOLLOWING LEFT KNEE JOINT REPLACEMENT SURGERY: Primary | ICD-10-CM

## 2025-03-07 ENCOUNTER — PATIENT MESSAGE (OUTPATIENT)
Dept: ORTHOPEDICS | Facility: CLINIC | Age: 65
End: 2025-03-07
Payer: OTHER GOVERNMENT

## (undated) DEVICE — DRAPE INCISE IOBAN 2 23X23IN

## (undated) DEVICE — YANKAUER OPEN TIP W/O VENT

## (undated) DEVICE — DECANTER FLUID TRNSF WHITE 9IN

## (undated) DEVICE — NDL BLUNT W/ FILTER 18GX1.5IN

## (undated) DEVICE — Device

## (undated) DEVICE — DRAPE TIBURON ORTHOPEDIC SPLIT

## (undated) DEVICE — DURAPREP SURG SCRUB 26ML

## (undated) DEVICE — COVER TABLE HVY DTY 60X90IN

## (undated) DEVICE — CARD UNIV KNEE NAVGTN SW-SCL L

## (undated) DEVICE — PAD PREP CUFFED NS 24X48IN

## (undated) DEVICE — SCRUB 10% POVIDONE IODINE 4OZ

## (undated) DEVICE — ADHESIVE DERMABOND ADVANCED

## (undated) DEVICE — PINABALL PRELOADED PINS
Type: IMPLANTABLE DEVICE | Site: KNEE | Status: NON-FUNCTIONAL
Brand: PINABALL
Removed: 2024-08-19

## (undated) DEVICE — SUT STRATAFIX PDS 1 CTX 18IN

## (undated) DEVICE — GLOVE SENSICARE PI GRN 8

## (undated) DEVICE — SUT CTD VICRYL 2.0

## (undated) DEVICE — SOL IRR NACL .9% 3000ML

## (undated) DEVICE — ALCOHOL 70% ISOP W/GREEN 16OZ

## (undated) DEVICE — HOOD T-5 TEAR AWAY STERILE

## (undated) DEVICE — SYR 10CC LUER LOCK

## (undated) DEVICE — ELECTRODE REM PLYHSV RETURN 9

## (undated) DEVICE — INJECTION SODIUM CHLORIDE 50ML

## (undated) DEVICE — ELECTRODE BLD 1 INCH TEFLON

## (undated) DEVICE — SUT VICRYL 1 OB 36 CTX

## (undated) DEVICE — NDL HYPO STD REG BVL 18GX1.5IN

## (undated) DEVICE — BLADE SAW RECIP 76MMX12.5MM

## (undated) DEVICE — SYR ONLY LUER LOCK 20CC

## (undated) DEVICE — ADAPTER DUAL IRRIGATION

## (undated) DEVICE — DRESSING OPTIFOAM AG 4X12IN

## (undated) DEVICE — CONTAINER SPECIMEN STRL 3OZ

## (undated) DEVICE — BLADE 90X13X1.27MM

## (undated) DEVICE — SUT CTD VICRYL 1 UND BR

## (undated) DEVICE — SPONGE LAP 18X18 PREWASHED

## (undated) DEVICE — BATTERY INSTRUMENT

## (undated) DEVICE — SPONGE COTTON TRAY 4X4IN

## (undated) DEVICE — MANIFOLD 4 PORT

## (undated) DEVICE — GLOVE SENSICARE PI SURG 7.5

## (undated) DEVICE — COVER OVERHEAD SURG LT BLUE

## (undated) DEVICE — BLADE RMR PATELLA 35MM

## (undated) DEVICE — CLOSURE SKIN STERI STRIP 1/2X4

## (undated) DEVICE — DRAPE CASSETTE 20 X 40

## (undated) DEVICE — NDL 22GA X1 1/2 REG BEVEL

## (undated) DEVICE — INTERPULSE SET

## (undated) DEVICE — NOZZLE BNE INJ CEM FEM POST 65